# Patient Record
Sex: FEMALE | Race: BLACK OR AFRICAN AMERICAN | NOT HISPANIC OR LATINO | Employment: FULL TIME | URBAN - METROPOLITAN AREA
[De-identification: names, ages, dates, MRNs, and addresses within clinical notes are randomized per-mention and may not be internally consistent; named-entity substitution may affect disease eponyms.]

---

## 2018-02-19 ENCOUNTER — HOSPITAL ENCOUNTER (EMERGENCY)
Facility: HOSPITAL | Age: 53
Discharge: HOME | End: 2018-02-19
Attending: EMERGENCY MEDICINE | Admitting: EMERGENCY MEDICINE

## 2018-02-19 VITALS
OXYGEN SATURATION: 100 % | DIASTOLIC BLOOD PRESSURE: 77 MMHG | RESPIRATION RATE: 17 BRPM | SYSTOLIC BLOOD PRESSURE: 149 MMHG | TEMPERATURE: 97.9 F | HEIGHT: 63 IN | BODY MASS INDEX: 31.71 KG/M2 | HEART RATE: 76 BPM | WEIGHT: 179 LBS

## 2018-02-19 DIAGNOSIS — R11.2 NON-INTRACTABLE VOMITING WITH NAUSEA, UNSPECIFIED VOMITING TYPE: Primary | ICD-10-CM

## 2018-02-19 DIAGNOSIS — E87.5 HYPERKALEMIA: ICD-10-CM

## 2018-02-19 DIAGNOSIS — N39.0 ACUTE UTI: ICD-10-CM

## 2018-02-19 LAB
BACTERIA UR QL AUTO: ABNORMAL /HPF
BASOPHILS # BLD AUTO: 0.01 10*3/MM3 (ref 0–0.2)
BASOPHILS NFR BLD AUTO: 0.1 % (ref 0–1)
BILIRUB UR QL STRIP: NEGATIVE
BUN BLDA-MCNC: 19 MG/DL (ref 8–26)
BUN BLDA-MCNC: 27 MG/DL (ref 8–26)
CA-I BLDA-SCNC: 1.14 MMOL/L (ref 1.2–1.32)
CA-I BLDA-SCNC: 1.21 MMOL/L (ref 1.2–1.32)
CHLORIDE BLDA-SCNC: 103 MMOL/L (ref 98–109)
CHLORIDE BLDA-SCNC: 103 MMOL/L (ref 98–109)
CLARITY UR: CLEAR
CO2 BLDA-SCNC: 26 MMOL/L (ref 24–29)
CO2 BLDA-SCNC: 26 MMOL/L (ref 24–29)
COLOR UR: YELLOW
CREAT BLDA-MCNC: 0.8 MG/DL (ref 0.6–1.3)
CREAT BLDA-MCNC: 0.9 MG/DL (ref 0.6–1.3)
DEPRECATED RDW RBC AUTO: 45.9 FL (ref 37–54)
EOSINOPHIL # BLD AUTO: 0.07 10*3/MM3 (ref 0–0.3)
EOSINOPHIL NFR BLD AUTO: 0.9 % (ref 0–3)
ERYTHROCYTE [DISTWIDTH] IN BLOOD BY AUTOMATED COUNT: 14.7 % (ref 11.3–14.5)
FLUAV AG NPH QL: NEGATIVE
FLUBV AG NPH QL IA: NEGATIVE
GLUCOSE BLDC GLUCOMTR-MCNC: 166 MG/DL (ref 70–130)
GLUCOSE BLDC GLUCOMTR-MCNC: 244 MG/DL (ref 70–130)
GLUCOSE UR STRIP-MCNC: NEGATIVE MG/DL
HCT VFR BLD AUTO: 48.7 % (ref 34.5–44)
HCT VFR BLDA CALC: 45 % (ref 38–51)
HCT VFR BLDA CALC: 51 % (ref 38–51)
HGB BLD-MCNC: 15.9 G/DL (ref 11.5–15.5)
HGB BLDA-MCNC: 15.3 G/DL (ref 12–17)
HGB BLDA-MCNC: 17.3 G/DL (ref 12–17)
HGB UR QL STRIP.AUTO: ABNORMAL
HYALINE CASTS UR QL AUTO: ABNORMAL /LPF
IMM GRANULOCYTES # BLD: 0.03 10*3/MM3 (ref 0–0.03)
IMM GRANULOCYTES NFR BLD: 0.4 % (ref 0–0.6)
KETONES UR QL STRIP: NEGATIVE
LEUKOCYTE ESTERASE UR QL STRIP.AUTO: ABNORMAL
LYMPHOCYTES # BLD AUTO: 0.66 10*3/MM3 (ref 0.6–4.8)
LYMPHOCYTES NFR BLD AUTO: 8.1 % (ref 24–44)
MCH RBC QN AUTO: 27.9 PG (ref 27–31)
MCHC RBC AUTO-ENTMCNC: 32.6 G/DL (ref 32–36)
MCV RBC AUTO: 85.6 FL (ref 80–99)
MONOCYTES # BLD AUTO: 0.72 10*3/MM3 (ref 0–1)
MONOCYTES NFR BLD AUTO: 8.8 % (ref 0–12)
NEUTROPHILS # BLD AUTO: 6.68 10*3/MM3 (ref 1.5–8.3)
NEUTROPHILS NFR BLD AUTO: 81.7 % (ref 41–71)
NITRITE UR QL STRIP: NEGATIVE
PH UR STRIP.AUTO: 6 [PH] (ref 5–8)
PLATELET # BLD AUTO: 309 10*3/MM3 (ref 150–450)
PMV BLD AUTO: 10.4 FL (ref 6–12)
POTASSIUM BLDA-SCNC: 3.3 MMOL/L (ref 3.5–4.9)
POTASSIUM BLDA-SCNC: 5.6 MMOL/L (ref 3.5–4.9)
PROT UR QL STRIP: ABNORMAL
RBC # BLD AUTO: 5.69 10*6/MM3 (ref 3.89–5.14)
RBC # UR: ABNORMAL /HPF
REF LAB TEST METHOD: ABNORMAL
SODIUM BLDA-SCNC: 142 MMOL/L (ref 138–146)
SODIUM BLDA-SCNC: 144 MMOL/L (ref 138–146)
SP GR UR STRIP: 1.01 (ref 1–1.03)
SQUAMOUS #/AREA URNS HPF: ABNORMAL /HPF
UROBILINOGEN UR QL STRIP: ABNORMAL
WBC NRBC COR # BLD: 8.17 10*3/MM3 (ref 3.5–10.8)
WBC UR QL AUTO: ABNORMAL /HPF

## 2018-02-19 PROCEDURE — 87804 INFLUENZA ASSAY W/OPTIC: CPT | Performed by: EMERGENCY MEDICINE

## 2018-02-19 PROCEDURE — 94640 AIRWAY INHALATION TREATMENT: CPT

## 2018-02-19 PROCEDURE — 85025 COMPLETE CBC W/AUTO DIFF WBC: CPT | Performed by: EMERGENCY MEDICINE

## 2018-02-19 PROCEDURE — 25010000002 CEFTRIAXONE PER 250 MG: Performed by: EMERGENCY MEDICINE

## 2018-02-19 PROCEDURE — 80047 BASIC METABLC PNL IONIZED CA: CPT

## 2018-02-19 PROCEDURE — 81001 URINALYSIS AUTO W/SCOPE: CPT | Performed by: EMERGENCY MEDICINE

## 2018-02-19 PROCEDURE — 25010000002 ONDANSETRON PER 1 MG: Performed by: EMERGENCY MEDICINE

## 2018-02-19 PROCEDURE — 94644 CONT INHLJ TX 1ST HOUR: CPT

## 2018-02-19 PROCEDURE — 63710000001 INSULIN REGULAR HUMAN PER 5 UNITS: Performed by: EMERGENCY MEDICINE

## 2018-02-19 PROCEDURE — 25010000002 PROMETHAZINE PER 50 MG: Performed by: EMERGENCY MEDICINE

## 2018-02-19 PROCEDURE — 85014 HEMATOCRIT: CPT

## 2018-02-19 PROCEDURE — 87086 URINE CULTURE/COLONY COUNT: CPT | Performed by: EMERGENCY MEDICINE

## 2018-02-19 PROCEDURE — 93005 ELECTROCARDIOGRAM TRACING: CPT | Performed by: EMERGENCY MEDICINE

## 2018-02-19 PROCEDURE — 96365 THER/PROPH/DIAG IV INF INIT: CPT

## 2018-02-19 PROCEDURE — 96375 TX/PRO/DX INJ NEW DRUG ADDON: CPT

## 2018-02-19 PROCEDURE — 99284 EMERGENCY DEPT VISIT MOD MDM: CPT

## 2018-02-19 RX ORDER — ONDANSETRON 4 MG/1
4 TABLET, FILM COATED ORAL EVERY 6 HOURS PRN
Qty: 20 TABLET | Refills: 0 | Status: SHIPPED | OUTPATIENT
Start: 2018-02-19 | End: 2018-10-20

## 2018-02-19 RX ORDER — PROMETHAZINE HYDROCHLORIDE 25 MG/ML
12.5 INJECTION, SOLUTION INTRAMUSCULAR; INTRAVENOUS ONCE
Status: COMPLETED | OUTPATIENT
Start: 2018-02-19 | End: 2018-02-19

## 2018-02-19 RX ORDER — METOPROLOL SUCCINATE 100 MG/1
200 TABLET, EXTENDED RELEASE ORAL DAILY
COMMUNITY
End: 2018-10-20

## 2018-02-19 RX ORDER — DEXTROSE MONOHYDRATE 25 G/50ML
50 INJECTION, SOLUTION INTRAVENOUS ONCE
Status: COMPLETED | OUTPATIENT
Start: 2018-02-19 | End: 2018-02-19

## 2018-02-19 RX ORDER — ALBUTEROL SULFATE 2.5 MG/3ML
10 SOLUTION RESPIRATORY (INHALATION) ONCE
Status: COMPLETED | OUTPATIENT
Start: 2018-02-19 | End: 2018-02-19

## 2018-02-19 RX ORDER — SODIUM CHLORIDE 0.9 % (FLUSH) 0.9 %
10 SYRINGE (ML) INJECTION AS NEEDED
Status: DISCONTINUED | OUTPATIENT
Start: 2018-02-19 | End: 2018-02-19 | Stop reason: HOSPADM

## 2018-02-19 RX ORDER — NITROFURANTOIN 25; 75 MG/1; MG/1
100 CAPSULE ORAL 2 TIMES DAILY
Qty: 10 CAPSULE | Refills: 0 | Status: SHIPPED | OUTPATIENT
Start: 2018-02-19 | End: 2018-10-20

## 2018-02-19 RX ORDER — ONDANSETRON 2 MG/ML
4 INJECTION INTRAMUSCULAR; INTRAVENOUS ONCE
Status: COMPLETED | OUTPATIENT
Start: 2018-02-19 | End: 2018-02-19

## 2018-02-19 RX ORDER — CEFTRIAXONE SODIUM 1 G/50ML
1 INJECTION, SOLUTION INTRAVENOUS ONCE
Status: COMPLETED | OUTPATIENT
Start: 2018-02-19 | End: 2018-02-19

## 2018-02-19 RX ADMIN — INSULIN HUMAN 10 UNITS: 100 INJECTION, SOLUTION PARENTERAL at 06:18

## 2018-02-19 RX ADMIN — Medication 10 ML: at 05:39

## 2018-02-19 RX ADMIN — ONDANSETRON 4 MG: 2 INJECTION INTRAMUSCULAR; INTRAVENOUS at 05:43

## 2018-02-19 RX ADMIN — CEFTRIAXONE SODIUM 1 G: 1 INJECTION, SOLUTION INTRAVENOUS at 06:45

## 2018-02-19 RX ADMIN — SODIUM CHLORIDE 1000 ML: 9 INJECTION, SOLUTION INTRAVENOUS at 05:41

## 2018-02-19 RX ADMIN — DEXTROSE MONOHYDRATE 50 ML: 25 INJECTION, SOLUTION INTRAVENOUS at 06:14

## 2018-02-19 RX ADMIN — PROMETHAZINE HYDROCHLORIDE 12.5 MG: 25 INJECTION INTRAMUSCULAR; INTRAVENOUS at 05:50

## 2018-02-19 RX ADMIN — ALBUTEROL SULFATE 10 MG: 2.5 SOLUTION RESPIRATORY (INHALATION) at 06:02

## 2018-02-19 NOTE — ED PROVIDER NOTES
"Subjective   HPI Comments: 52-year-old female presents complaining of nausea and vomiting.  She states that since yesterday, she has been experiencing several episodes of nonbloody/nonbilious vomiting accompanied by crampy abdominal pain.  No fevers or diarrhea.  No recent travel.  No recent antibiotics or recent diet changes.  She states that a family member over the weekend had identical symptoms and she feels that she likely \"caught something.\"  She denies any current abdominal pain, chest pain, shortness of breath, or palpitations, but she endorses significant nausea at this time.      History provided by:  Patient   used: No        Review of Systems   Gastrointestinal: Positive for abdominal pain, nausea and vomiting.   All other systems reviewed and are negative.      No past medical history on file.    Allergies not on file    No past surgical history on file.    No family history on file.    Social History     Social History   • Marital status: N/A     Spouse name: N/A   • Number of children: N/A   • Years of education: N/A     Social History Main Topics   • Smoking status: Not on file   • Smokeless tobacco: Not on file   • Alcohol use Not on file   • Drug use: Not on file   • Sexual activity: Not on file     Other Topics Concern   • Not on file     Social History Narrative           Objective   Physical Exam   Constitutional: She is oriented to person, place, and time. She appears well-developed and well-nourished. No distress.   Well-appearing female in no acute distress   HENT:   Head: Normocephalic and atraumatic.   Mouth/Throat: Oropharynx is clear and moist.   Cardiovascular: Normal rate, regular rhythm and normal heart sounds.  Exam reveals no gallop and no friction rub.    No murmur heard.  Pulmonary/Chest: Effort normal and breath sounds normal. No respiratory distress. She has no wheezes. She has no rales.   Abdominal: Soft. Bowel sounds are normal. She exhibits no distension " "and no mass. There is no tenderness. There is no rebound and no guarding.   No focal tenderness to palpation or peritoneal signs   Genitourinary:   Genitourinary Comments: No CVA tenderness to palpation   Musculoskeletal: Normal range of motion.   Neurological: She is alert and oriented to person, place, and time.   Normal gait   Skin: Skin is warm and dry. No rash noted. She is not diaphoretic. No erythema.   Psychiatric: She has a normal mood and affect. Judgment and thought content normal.   Nursing note and vitals reviewed.      Procedures         ED Course  ED Course   Comment By Time   52-year-old female presents complaining of nausea and vomiting since yesterday.  Of note, she was around a sick family member over the weekend with identical symptoms and feels that she likely \"caught something from him.\"  On arrival to the ED, patient well-appearing with reassuring vital signs and nonsurgical abdomen.  No CVA tenderness to palpation.  Doubt cardiac etiology based on history, exam, and clinical presentation.  We will obtain labs and reassess after IV fluids and anti-emetics. Morro Boyd MD 02/19 0530   Labs remarkable for a potassium of 5.6.  Insulin, D50, and albuterol given for hyperkalemia. Morro Boyd MD 02/19 0552   EKG revealed normal sinus rhythm with heart rate of 64, no ST segments suggestive of or concerning for ischemia, and normal QRS without widening. Morro Boyd MD 02/19 0600   Of note, the patient has had a prior hysterectomy. Morro Boyd MD 02/19 0600   Flu negative. Morro Boyd MD 02/19 0623   UA suggestive of infection.  Rocephin given.  Urine culture pending. Morro Boyd MD 02/19 0630   Upon reevaluation, patient markedly improved.  Tolerating by mouth without difficulty.  Reassured and counseled regarding symptomatic treatment.  She will follow-up with her primary care physician within one week.  Prescription for Zofran when necessary.  " Agreeable with plan and given appropriate return precautions. Morro Boyd MD 02/19 0634   K downtrending. Morro Boyd MD 02/19 0642        Recent Results (from the past 24 hour(s))   CBC Auto Differential    Collection Time: 02/19/18  5:47 AM   Result Value Ref Range    WBC 8.17 3.50 - 10.80 10*3/mm3    RBC 5.69 (H) 3.89 - 5.14 10*6/mm3    Hemoglobin 15.9 (H) 11.5 - 15.5 g/dL    Hematocrit 48.7 (H) 34.5 - 44.0 %    MCV 85.6 80.0 - 99.0 fL    MCH 27.9 27.0 - 31.0 pg    MCHC 32.6 32.0 - 36.0 g/dL    RDW 14.7 (H) 11.3 - 14.5 %    RDW-SD 45.9 37.0 - 54.0 fl    MPV 10.4 6.0 - 12.0 fL    Platelets 309 150 - 450 10*3/mm3    Neutrophil % 81.7 (H) 41.0 - 71.0 %    Lymphocyte % 8.1 (L) 24.0 - 44.0 %    Monocyte % 8.8 0.0 - 12.0 %    Eosinophil % 0.9 0.0 - 3.0 %    Basophil % 0.1 0.0 - 1.0 %    Immature Grans % 0.4 0.0 - 0.6 %    Neutrophils, Absolute 6.68 1.50 - 8.30 10*3/mm3    Lymphocytes, Absolute 0.66 0.60 - 4.80 10*3/mm3    Monocytes, Absolute 0.72 0.00 - 1.00 10*3/mm3    Eosinophils, Absolute 0.07 0.00 - 0.30 10*3/mm3    Basophils, Absolute 0.01 0.00 - 0.20 10*3/mm3    Immature Grans, Absolute 0.03 0.00 - 0.03 10*3/mm3   POC CHEM 8    Collection Time: 02/19/18  5:48 AM   Result Value Ref Range    Glucose 166 (H) 70 - 130 mg/dL    BUN, Arterial 27 (H) 8 - 26 mg/dL    Creatinine 0.80 0.60 - 1.30 mg/dL    Sodium 142 138 - 146 mmol/L    Potassium 5.6 (H) 3.5 - 4.9 mmol/L    Chloride 103 98 - 109 mmol/L    Total CO2 26 24 - 29 mmol/L    Hemoglobin 17.3 (H) 12.0 - 17.0 g/dL    Hematocrit 51 38 - 51 %    Ionized Calcium 1.14 (L) 1.20 - 1.32 mmol/L   Influenza Antigen, Rapid - Swab, Nasopharynx    Collection Time: 02/19/18  5:49 AM   Result Value Ref Range    Influenza A Ag, EIA Negative Negative    Influenza B Ag, EIA Negative Negative   Urinalysis With / Culture If Indicated - Urine, Clean Catch    Collection Time: 02/19/18  6:03 AM   Result Value Ref Range    Color, UA Yellow Yellow, Straw    Appearance, UA  "Clear Clear    pH, UA 6.0 5.0 - 8.0    Specific Gravity, UA 1.010 1.005 - 1.030    Glucose, UA Negative Negative    Ketones, UA Negative Negative    Bilirubin, UA Negative Negative    Blood, UA Moderate (2+) (A) Negative    Protein, UA 30 mg/dL (1+) (A) Negative    Leuk Esterase, UA Large (3+) (A) Negative    Nitrite, UA Negative Negative    Urobilinogen, UA 1.0 E.U./dL 0.2 - 1.0 E.U./dL   Urinalysis, Microscopic Only - Urine, Clean Catch    Collection Time: 02/19/18  6:03 AM   Result Value Ref Range    RBC, UA 21-30 (A) None Seen, 0-2 /HPF    WBC, UA Too Numerous to Count (A) None Seen, 0-2 /HPF    Bacteria, UA 1+ (A) None Seen, Trace /HPF    Squamous Epithelial Cells, UA 21-30 (A) None Seen, 0-2 /HPF    Hyaline Casts, UA None Seen 0 - 6 /LPF    Methodology Manual Light Microscopy      Note: In addition to lab results from this visit, the labs listed above may include labs taken at another facility or during a different encounter within the last 24 hours. Please correlate lab times with ED admission and discharge times for further clarification of the services performed during this visit.    No orders to display     Vitals:    02/19/18 0514 02/19/18 0600 02/19/18 0602   BP: 129/74 131/72    BP Location: Left arm Right arm    Patient Position: Sitting Lying    Pulse: 76 65 58   Resp: 18  17   Temp: 97.9 °F (36.6 °C)     TempSrc: Oral     SpO2: 97% 96% 94%   Weight: 81.2 kg (179 lb)     Height: 160 cm (63\")       Medications   sodium chloride 0.9 % flush 10 mL (10 mL Intravenous Given 2/19/18 0539)   cefTRIAXone (ROCEPHIN) IVPB 1 g (not administered)   sodium chloride 0.9 % bolus 1,000 mL (1,000 mL Intravenous New Bag 2/19/18 0541)   ondansetron (ZOFRAN) injection 4 mg (4 mg Intravenous Given 2/19/18 0543)   promethazine (PHENERGAN) injection 12.5 mg (12.5 mg Intravenous Given 2/19/18 0550)   dextrose (D50W) solution 50 mL (50 mL Intravenous Given 2/19/18 8714)   insulin regular (humuLIN R,novoLIN R) injection 10 " Units (10 Units Intravenous Given 2/19/18 0618)   albuterol (PROVENTIL) nebulizer solution 0.083% 2.5 mg/3mL (10 mg Nebulization Given 2/19/18 0602)     ECG/EMG Results (last 24 hours)     Procedure Component Value Units Date/Time    ECG 12 Lead [[id number]] Collected:  02/19/18 0558     Updated:  02/19/18 0559                  [facility]    Final diagnoses:   Non-intractable vomiting with nausea, unspecified vomiting type   Hyperkalemia   Acute UTI            Morro Boyd MD  02/19/18 0643

## 2018-02-21 LAB
BACTERIA SPEC AEROBE CULT: NORMAL
BACTERIA SPEC AEROBE CULT: NORMAL

## 2018-10-20 ENCOUNTER — APPOINTMENT (OUTPATIENT)
Dept: GENERAL RADIOLOGY | Facility: HOSPITAL | Age: 53
End: 2018-10-20

## 2018-10-20 ENCOUNTER — HOSPITAL ENCOUNTER (EMERGENCY)
Facility: HOSPITAL | Age: 53
Discharge: HOME | End: 2018-10-20
Attending: EMERGENCY MEDICINE | Admitting: EMERGENCY MEDICINE

## 2018-10-20 VITALS
HEIGHT: 62 IN | OXYGEN SATURATION: 98 % | DIASTOLIC BLOOD PRESSURE: 74 MMHG | HEART RATE: 60 BPM | TEMPERATURE: 97.6 F | WEIGHT: 180 LBS | RESPIRATION RATE: 18 BRPM | BODY MASS INDEX: 33.13 KG/M2 | SYSTOLIC BLOOD PRESSURE: 125 MMHG

## 2018-10-20 DIAGNOSIS — H81.391 PERIPHERAL VERTIGO INVOLVING RIGHT EAR: Primary | ICD-10-CM

## 2018-10-20 DIAGNOSIS — I10 ELEVATED BLOOD PRESSURE READING WITH DIAGNOSIS OF HYPERTENSION: ICD-10-CM

## 2018-10-20 DIAGNOSIS — H57.11 PAIN IN RIGHT EYE: ICD-10-CM

## 2018-10-20 DIAGNOSIS — R11.0 NAUSEA: ICD-10-CM

## 2018-10-20 LAB
ALBUMIN SERPL-MCNC: 4.43 G/DL (ref 3.2–4.8)
ALBUMIN/GLOB SERPL: 1.6 G/DL (ref 1.5–2.5)
ALP SERPL-CCNC: 95 U/L (ref 25–100)
ALT SERPL W P-5'-P-CCNC: 77 U/L (ref 7–40)
ANION GAP SERPL CALCULATED.3IONS-SCNC: 8 MMOL/L (ref 3–11)
AST SERPL-CCNC: 48 U/L (ref 0–33)
BASOPHILS # BLD AUTO: 0.01 10*3/MM3 (ref 0–0.2)
BASOPHILS NFR BLD AUTO: 0.2 % (ref 0–1)
BILIRUB SERPL-MCNC: 0.7 MG/DL (ref 0.3–1.2)
BUN BLD-MCNC: 12 MG/DL (ref 9–23)
BUN/CREAT SERPL: 15.2 (ref 7–25)
CALCIUM SPEC-SCNC: 9.8 MG/DL (ref 8.7–10.4)
CHLORIDE SERPL-SCNC: 107 MMOL/L (ref 99–109)
CO2 SERPL-SCNC: 27 MMOL/L (ref 20–31)
CREAT BLD-MCNC: 0.79 MG/DL (ref 0.6–1.3)
DEPRECATED RDW RBC AUTO: 46.6 FL (ref 37–54)
EOSINOPHIL # BLD AUTO: 0.23 10*3/MM3 (ref 0–0.3)
EOSINOPHIL NFR BLD AUTO: 3.7 % (ref 0–3)
ERYTHROCYTE [DISTWIDTH] IN BLOOD BY AUTOMATED COUNT: 15.5 % (ref 11.3–14.5)
GFR SERPL CREATININE-BSD FRML MDRD: 92 ML/MIN/1.73
GLOBULIN UR ELPH-MCNC: 2.8 GM/DL
GLUCOSE BLD-MCNC: 149 MG/DL (ref 70–100)
HCT VFR BLD AUTO: 42.7 % (ref 34.5–44)
HGB BLD-MCNC: 14.1 G/DL (ref 11.5–15.5)
HOLD SPECIMEN: NORMAL
IMM GRANULOCYTES # BLD: 0.01 10*3/MM3 (ref 0–0.03)
IMM GRANULOCYTES NFR BLD: 0.2 % (ref 0–0.6)
LYMPHOCYTES # BLD AUTO: 2 10*3/MM3 (ref 0.6–4.8)
LYMPHOCYTES NFR BLD AUTO: 31.9 % (ref 24–44)
MAGNESIUM SERPL-MCNC: 2.2 MG/DL (ref 1.3–2.7)
MCH RBC QN AUTO: 27.5 PG (ref 27–31)
MCHC RBC AUTO-ENTMCNC: 33 G/DL (ref 32–36)
MCV RBC AUTO: 83.4 FL (ref 80–99)
MONOCYTES # BLD AUTO: 0.72 10*3/MM3 (ref 0–1)
MONOCYTES NFR BLD AUTO: 11.5 % (ref 0–12)
NEUTROPHILS # BLD AUTO: 3.3 10*3/MM3 (ref 1.5–8.3)
NEUTROPHILS NFR BLD AUTO: 52.7 % (ref 41–71)
NRBC BLD MANUAL-RTO: 0 /100 WBC (ref 0–0)
PLATELET # BLD AUTO: 293 10*3/MM3 (ref 150–450)
PMV BLD AUTO: 10.2 FL (ref 6–12)
POTASSIUM BLD-SCNC: 3.3 MMOL/L (ref 3.5–5.5)
PROT SERPL-MCNC: 7.2 G/DL (ref 5.7–8.2)
RBC # BLD AUTO: 5.12 10*6/MM3 (ref 3.89–5.14)
SODIUM BLD-SCNC: 142 MMOL/L (ref 132–146)
TROPONIN I SERPL-MCNC: <0.006 NG/ML
WBC NRBC COR # BLD: 6.26 10*3/MM3 (ref 3.5–10.8)
WHOLE BLOOD HOLD SPECIMEN: NORMAL
WHOLE BLOOD HOLD SPECIMEN: NORMAL

## 2018-10-20 PROCEDURE — 93005 ELECTROCARDIOGRAM TRACING: CPT | Performed by: EMERGENCY MEDICINE

## 2018-10-20 PROCEDURE — 99284 EMERGENCY DEPT VISIT MOD MDM: CPT

## 2018-10-20 PROCEDURE — 83735 ASSAY OF MAGNESIUM: CPT | Performed by: EMERGENCY MEDICINE

## 2018-10-20 PROCEDURE — 80053 COMPREHEN METABOLIC PANEL: CPT | Performed by: EMERGENCY MEDICINE

## 2018-10-20 PROCEDURE — 25010000002 ONDANSETRON PER 1 MG: Performed by: EMERGENCY MEDICINE

## 2018-10-20 PROCEDURE — 84484 ASSAY OF TROPONIN QUANT: CPT | Performed by: EMERGENCY MEDICINE

## 2018-10-20 PROCEDURE — 85025 COMPLETE CBC W/AUTO DIFF WBC: CPT | Performed by: EMERGENCY MEDICINE

## 2018-10-20 PROCEDURE — 71045 X-RAY EXAM CHEST 1 VIEW: CPT

## 2018-10-20 PROCEDURE — 96374 THER/PROPH/DIAG INJ IV PUSH: CPT

## 2018-10-20 RX ORDER — SODIUM CHLORIDE 0.9 % (FLUSH) 0.9 %
10 SYRINGE (ML) INJECTION AS NEEDED
Status: DISCONTINUED | OUTPATIENT
Start: 2018-10-20 | End: 2018-10-20 | Stop reason: HOSPADM

## 2018-10-20 RX ORDER — MECLIZINE HYDROCHLORIDE 25 MG/1
25 TABLET ORAL 3 TIMES DAILY PRN
Qty: 21 TABLET | Refills: 0 | OUTPATIENT
Start: 2018-10-20 | End: 2019-07-15

## 2018-10-20 RX ORDER — CETIRIZINE HYDROCHLORIDE, PSEUDOEPHEDRINE HYDROCHLORIDE 5; 120 MG/1; MG/1
1 TABLET, FILM COATED, EXTENDED RELEASE ORAL 2 TIMES DAILY
Status: DISCONTINUED | OUTPATIENT
Start: 2018-10-20 | End: 2018-10-20 | Stop reason: HOSPADM

## 2018-10-20 RX ORDER — FEXOFENADINE HCL 180 MG/1
180 TABLET ORAL DAILY
Qty: 30 TABLET | Refills: 0 | OUTPATIENT
Start: 2018-10-20 | End: 2019-07-15

## 2018-10-20 RX ORDER — FLUTICASONE PROPIONATE 50 MCG
2 SPRAY, SUSPENSION (ML) NASAL DAILY
Qty: 1 BOTTLE | Refills: 0 | OUTPATIENT
Start: 2018-10-20 | End: 2019-07-15

## 2018-10-20 RX ORDER — TETRACAINE HYDROCHLORIDE 5 MG/ML
2 SOLUTION OPHTHALMIC ONCE
Status: COMPLETED | OUTPATIENT
Start: 2018-10-20 | End: 2018-10-20

## 2018-10-20 RX ORDER — OXYMETAZOLINE HYDROCHLORIDE 0.05 G/100ML
1 SPRAY NASAL ONCE
Status: COMPLETED | OUTPATIENT
Start: 2018-10-20 | End: 2018-10-20

## 2018-10-20 RX ORDER — BENAZEPRIL HYDROCHLORIDE 40 MG/1
TABLET, FILM COATED ORAL DAILY
COMMUNITY

## 2018-10-20 RX ORDER — MECLIZINE HYDROCHLORIDE 25 MG/1
25 TABLET ORAL ONCE
Status: COMPLETED | OUTPATIENT
Start: 2018-10-20 | End: 2018-10-20

## 2018-10-20 RX ORDER — ONDANSETRON 2 MG/ML
4 INJECTION INTRAMUSCULAR; INTRAVENOUS ONCE
Status: COMPLETED | OUTPATIENT
Start: 2018-10-20 | End: 2018-10-20

## 2018-10-20 RX ADMIN — OXYMETAZOLINE HYDROCHLORIDE 1 SPRAY: 5 SPRAY NASAL at 07:29

## 2018-10-20 RX ADMIN — ONDANSETRON HYDROCHLORIDE 4 MG: 2 INJECTION, SOLUTION INTRAMUSCULAR; INTRAVENOUS at 07:02

## 2018-10-20 RX ADMIN — MECLIZINE 25 MG: 25 TABLET ORAL at 07:02

## 2018-10-20 RX ADMIN — CETIRIZINE HYDROCHLORIDE AND PSEUDOEPHEDRINE HYDROCHLORIDE 1 TABLET: 5; 120 TABLET, FILM COATED, EXTENDED RELEASE ORAL at 07:49

## 2018-10-20 RX ADMIN — TETRACAINE HYDROCHLORIDE 2 DROP: 5 SOLUTION OPHTHALMIC at 07:02

## 2018-10-20 NOTE — ED PROVIDER NOTES
"Subjective   The patient presents to the emergency department with the onset of dizziness and nausea when she woke this morning.  She reports she felt fine when she went to bed last night.  Patient reports the symptoms are worse with head movement as well as improved with sitting still and not moving.  Patient denies any history of similar.  No other deficits and no weakness.  She does report some ongoing right eye pain for last several weeks for which she saw her eye doctor and was referred to \"a specialist\".  She reports they didn't do any specific testing and had no diagnosis.  She reports the right eye has been intermittently painful and red.  She denies any stroke history no family history of strokes.  The patient has a history of hypertension.  She has no history of diabetes or hyperlipidemia.  No cardiac history.  No fever or chills.  No headache or neck pain.  No other acute symptoms or complaints this time.        History provided by:  Patient      Review of Systems   Constitutional: Negative.    Eyes: Positive for pain and redness.   Respiratory: Negative.    Cardiovascular: Negative.    Musculoskeletal: Negative.    Skin: Negative.    Neurological: Positive for dizziness. Negative for facial asymmetry, speech difficulty, weakness, light-headedness, numbness and headaches.   Hematological: Negative.    Psychiatric/Behavioral: Negative.    All other systems reviewed and are negative.      Past Medical History:   Diagnosis Date   • Hypertension        No Known Allergies    Past Surgical History:   Procedure Laterality Date   • BLADDER SURGERY     • HYSTERECTOMY         History reviewed. No pertinent family history.    Social History     Social History   • Marital status:      Social History Main Topics   • Smoking status: Current Every Day Smoker     Packs/day: 0.50     Types: Cigarettes   • Smokeless tobacco: Never Used   • Alcohol use Yes      Comment: occasional   • Drug use: No   • Sexual " activity: Defer     Other Topics Concern   • Not on file           Objective   Physical Exam   Constitutional: She is oriented to person, place, and time. She appears well-developed and well-nourished.   HENT:   Head: Normocephalic and atraumatic.   Mouth/Throat: Oropharynx is clear and moist.   Eyes: Pupils are equal, round, and reactive to light. Right eye exhibits no discharge, no exudate and no hordeolum. Left eye exhibits no discharge, no exudate and no hordeolum. Right conjunctiva is not injected. Right conjunctiva has no hemorrhage. Left conjunctiva is not injected. Left conjunctiva has no hemorrhage. Right eye exhibits nystagmus.   [facility] Ton[facility] used at bedside shows an intraocular pressures of 13 on the right and 11 on the left.    Neck: Normal range of motion. Neck supple. Normal carotid pulses present. Carotid bruit is not present.   Cardiovascular: Normal rate, regular rhythm, normal heart sounds and intact distal pulses.    Pulmonary/Chest: Effort normal and breath sounds normal.   Abdominal: Soft. Bowel sounds are normal.   Musculoskeletal: Normal range of motion.   Neurological: She is alert and oriented to person, place, and time.   HINTS exam inconclusive.  No neurologic deficit.  NIH stroke scale 0.   Skin: Skin is warm and dry.   Psychiatric: She has a normal mood and affect. Her behavior is normal.   Nursing note and vitals reviewed.      Procedures           ED Course  ED Course as of Oct 20 1517   Sat Oct 20, 2018   0648 HINTS exam inconclusive as the patient is only minimally symptomatic at this time, though she did report symptoms worsened with the head movement.   [RS]   0718 Patient rechecked with [facility] IOP check 13OD/11OS.   [RS]   0804 The patient reports near complete resolution of her symptoms.  He reports only minimal symptoms when she gets up and moves.  Patient otherwise stable.  We'll discharge her home to follow-up with her doctor for ongoing care.  She is to follow-up  with her eye specialist as scheduled.  She is to return the ER for any concerns.  She voices understanding and is happy with this plan.  [RS]      ED Course User Index  [RS] Lucius Talley MD                  MDM  Number of Diagnoses or Management Options  Elevated blood pressure reading with diagnosis of hypertension:   Nausea:   Pain in right eye:   Peripheral vertigo involving right ear:   Diagnosis management comments: Recent Results (from the past 24 hour(s))  -Comprehensive Metabolic Panel  Collection Time: 10/20/18  6:55 AM       Result                      Value             Ref Range           Glucose                     149 (H)           70 - 100 mg/*       BUN                         12                9 - 23 mg/dL        Creatinine                  0.79              0.60 - 1.30 *       Sodium                      142               132 - 146 mm*       Potassium                   3.3 (L)           3.5 - 5.5 mm*       Chloride                    107               99 - 109 mmo*       CO2                         27.0              20.0 - 31.0 *       Calcium                     9.8               8.7 - 10.4 m*       Total Protein               7.2               5.7 - 8.2 g/*       Albumin                     4.43              3.20 - 4.80 *       ALT (SGPT)                  77 (H)            7 - 40 U/L          AST (SGOT)                  48 (H)            0 - 33 U/L          Alkaline Phosphatase        95                25 - 100 U/L        Total Bilirubin             0.7               0.3 - 1.2 mg*       eGFR   Amer          92                >60 mL/min/1*       Globulin                    2.8               gm/dL               A/G Ratio                   1.6               1.5 - 2.5 g/*       BUN/Creatinine Ratio        15.2              7.0 - 25.0          Anion Gap                   8.0               3.0 - 11.0 m*  -Magnesium  Collection Time: 10/20/18  6:55 AM       Result                       Value             Ref Range           Magnesium                   2.2               1.3 - 2.7 mg*  -Troponin  Collection Time: 10/20/18  6:55 AM       Result                      Value             Ref Range           Troponin I                  <0.006            <=0.039 ng/mL  -Light Blue Top  Collection Time: 10/20/18  6:55 AM       Result                      Value             Ref Range           Extra Tube                                                    hold for add-on  -Green Top (Gel)  Collection Time: 10/20/18  6:55 AM       Result                      Value             Ref Range           Extra Tube                                                    Hold for add-ons.  -Lavender Top  Collection Time: 10/20/18  6:55 AM       Result                      Value             Ref Range           Extra Tube                                                    hold for add-on  -CBC Auto Differential  Collection Time: 10/20/18  6:55 AM       Result                      Value             Ref Range           WBC                         6.26              3.50 - 10.80*       RBC                         5.12              3.89 - 5.14 *       Hemoglobin                  14.1              11.5 - 15.5 *       Hematocrit                  42.7              34.5 - 44.0 %       MCV                         83.4              80.0 - 99.0 *       MCH                         27.5              27.0 - 31.0 *       MCHC                        33.0              32.0 - 36.0 *       RDW                         15.5 (H)          11.3 - 14.5 %       RDW-SD                      46.6              37.0 - 54.0 *       MPV                         10.2              6.0 - 12.0 fL       Platelets                   293               150 - 450 10*       Neutrophil %                52.7              41.0 - 71.0 %       Lymphocyte %                31.9              24.0 - 44.0 %       Monocyte %                  11.5              0.0 - 12.0 %         Eosinophil %                3.7 (H)           0.0 - 3.0 %         Basophil %                  0.2               0.0 - 1.0 %         Immature Grans %            0.2               0.0 - 0.6 %         Neutrophils, Absolute       3.30              1.50 - 8.30 *       Lymphocytes, Absolute       2.00              0.60 - 4.80 *       Monocytes, Absolute         0.72              0.00 - 1.00 *       Eosinophils, Absolute       0.23              0.00 - 0.30 *       Basophils, Absolute         0.01              0.00 - 0.20 *       Immature Grans, Absolu*     0.01              0.00 - 0.03 *       nRBC                        0.0               0.0 - 0.0 /1*  Note: In addition to lab results from this visit, the labs listed above may include labs taken at another facility or during a different encounter within the last 24 hours. Please correlate lab times with ED admission and discharge times for further clarification of the services performed during this visit.    XR Chest 1 View   Final Result    No acute cardiopulmonary abnormality.         DICTATED:   10/20/2018    EDITED/ls :   10/20/2018          This report was finalized on 10/20/2018 1:47 PM by Jose De Souza.          -----------------------------------------------------            10/20/18  10/20/18  10/20/18  10/20/18               0700      0730      0800      0829     -----------------------------------------------------   BP:       126/71              125/74               Pulse:                59                  60       Resp:                 20                  18       Temp:                                              Temrc:                                           SpO2:    (!) 89%     95%       98%                 Weight:                                            Height:                                           -----------------------------------------------------  Medications  tetracaine  (ALTACAINE) 0.5 % ophthalmic solution 2 drop (2 drops Both Eyes Given 10/20/18 0702)  meclizine (ANTIVERT) tablet 25 mg (25 mg Oral Given 10/20/18 0702)  ondansetron (ZOFRAN) injection 4 mg (4 mg Intravenous Given 10/20/18 0702)  oxymetazoline (AFRIN) nasal spray 1 spray (1 spray Nasal Given 10/20/18 0729)  ECG/EMG Results (last 24 hours)     ** No results found for the last 24 hours. **           Amount and/or Complexity of Data Reviewed  Clinical lab tests: reviewed          Final diagnoses:   Peripheral vertigo involving right ear   Nausea   Pain in right eye   Elevated blood pressure reading with diagnosis of hypertension            Gee Mendoza  10/20/18 8226       Lucius Talley MD  10/20/18

## 2019-04-05 ENCOUNTER — HOSPITAL ENCOUNTER (EMERGENCY)
Facility: HOSPITAL | Age: 54
Discharge: HOME | End: 2019-04-05
Attending: EMERGENCY MEDICINE | Admitting: NURSE PRACTITIONER

## 2019-04-05 VITALS
DIASTOLIC BLOOD PRESSURE: 56 MMHG | HEART RATE: 56 BPM | SYSTOLIC BLOOD PRESSURE: 118 MMHG | HEIGHT: 62 IN | WEIGHT: 180 LBS | OXYGEN SATURATION: 92 % | TEMPERATURE: 97.8 F | RESPIRATION RATE: 18 BRPM | BODY MASS INDEX: 33.13 KG/M2

## 2019-04-05 DIAGNOSIS — R11.2 NON-INTRACTABLE VOMITING WITH NAUSEA, UNSPECIFIED VOMITING TYPE: Primary | ICD-10-CM

## 2019-04-05 DIAGNOSIS — R74.8 ELEVATED LIVER ENZYMES: ICD-10-CM

## 2019-04-05 LAB
ALBUMIN SERPL-MCNC: 4.82 G/DL (ref 3.2–4.8)
ALBUMIN/GLOB SERPL: 1.6 G/DL (ref 1.5–2.5)
ALP SERPL-CCNC: 112 U/L (ref 25–100)
ALT SERPL W P-5'-P-CCNC: 60 U/L (ref 7–40)
ANION GAP SERPL CALCULATED.3IONS-SCNC: 11 MMOL/L (ref 3–11)
AST SERPL-CCNC: 38 U/L (ref 0–33)
BACTERIA UR QL AUTO: ABNORMAL /HPF
BASOPHILS # BLD AUTO: 0.02 10*3/MM3 (ref 0–0.2)
BASOPHILS NFR BLD AUTO: 0.3 % (ref 0–1)
BILIRUB SERPL-MCNC: 0.8 MG/DL (ref 0.3–1.2)
BILIRUB UR QL STRIP: NEGATIVE
BUN BLD-MCNC: 12 MG/DL (ref 9–23)
BUN/CREAT SERPL: 14.3 (ref 7–25)
CALCIUM SPEC-SCNC: 10.6 MG/DL (ref 8.7–10.4)
CHLORIDE SERPL-SCNC: 102 MMOL/L (ref 99–109)
CLARITY UR: CLEAR
CO2 SERPL-SCNC: 28 MMOL/L (ref 20–31)
COLOR UR: YELLOW
CREAT BLD-MCNC: 0.84 MG/DL (ref 0.6–1.3)
DEPRECATED RDW RBC AUTO: 44.8 FL (ref 37–54)
EOSINOPHIL # BLD AUTO: 0.07 10*3/MM3 (ref 0–0.3)
EOSINOPHIL NFR BLD AUTO: 0.9 % (ref 0–3)
ERYTHROCYTE [DISTWIDTH] IN BLOOD BY AUTOMATED COUNT: 14.8 % (ref 11.3–14.5)
GFR SERPL CREATININE-BSD FRML MDRD: 86 ML/MIN/1.73
GLOBULIN UR ELPH-MCNC: 3 GM/DL
GLUCOSE BLD-MCNC: 125 MG/DL (ref 70–100)
GLUCOSE UR STRIP-MCNC: NEGATIVE MG/DL
HCT VFR BLD AUTO: 43.1 % (ref 34.5–44)
HGB BLD-MCNC: 14.7 G/DL (ref 11.5–15.5)
HGB UR QL STRIP.AUTO: ABNORMAL
HOLD SPECIMEN: NORMAL
HOLD SPECIMEN: NORMAL
HYALINE CASTS UR QL AUTO: ABNORMAL /LPF
IMM GRANULOCYTES # BLD AUTO: 0.01 10*3/MM3 (ref 0–0.05)
IMM GRANULOCYTES NFR BLD AUTO: 0.1 % (ref 0–0.6)
KETONES UR QL STRIP: NEGATIVE
LEUKOCYTE ESTERASE UR QL STRIP.AUTO: ABNORMAL
LIPASE SERPL-CCNC: 42 U/L (ref 6–51)
LYMPHOCYTES # BLD AUTO: 1.43 10*3/MM3 (ref 0.6–4.8)
LYMPHOCYTES NFR BLD AUTO: 18.9 % (ref 24–44)
MCH RBC QN AUTO: 28.1 PG (ref 27–31)
MCHC RBC AUTO-ENTMCNC: 34.1 G/DL (ref 32–36)
MCV RBC AUTO: 82.4 FL (ref 80–99)
MONOCYTES # BLD AUTO: 0.46 10*3/MM3 (ref 0–1)
MONOCYTES NFR BLD AUTO: 6.1 % (ref 0–12)
NEUTROPHILS # BLD AUTO: 5.59 10*3/MM3 (ref 1.5–8.3)
NEUTROPHILS NFR BLD AUTO: 73.7 % (ref 41–71)
NITRITE UR QL STRIP: NEGATIVE
PH UR STRIP.AUTO: >=9 [PH] (ref 5–8)
PLATELET # BLD AUTO: 307 10*3/MM3 (ref 150–450)
PMV BLD AUTO: 10.2 FL (ref 6–12)
POTASSIUM BLD-SCNC: 3.8 MMOL/L (ref 3.5–5.5)
PROT SERPL-MCNC: 7.8 G/DL (ref 5.7–8.2)
PROT UR QL STRIP: NEGATIVE
RBC # BLD AUTO: 5.23 10*6/MM3 (ref 3.89–5.14)
RBC # UR: ABNORMAL /HPF
REF LAB TEST METHOD: ABNORMAL
SODIUM BLD-SCNC: 141 MMOL/L (ref 132–146)
SP GR UR STRIP: 1.02 (ref 1–1.03)
SQUAMOUS #/AREA URNS HPF: ABNORMAL /HPF
UROBILINOGEN UR QL STRIP: ABNORMAL
WBC NRBC COR # BLD: 7.58 10*3/MM3 (ref 3.5–10.8)
WBC UR QL AUTO: ABNORMAL /HPF
WHOLE BLOOD HOLD SPECIMEN: NORMAL
WHOLE BLOOD HOLD SPECIMEN: NORMAL

## 2019-04-05 PROCEDURE — 99283 EMERGENCY DEPT VISIT LOW MDM: CPT

## 2019-04-05 PROCEDURE — 96374 THER/PROPH/DIAG INJ IV PUSH: CPT

## 2019-04-05 PROCEDURE — 80053 COMPREHEN METABOLIC PANEL: CPT

## 2019-04-05 PROCEDURE — 81001 URINALYSIS AUTO W/SCOPE: CPT | Performed by: EMERGENCY MEDICINE

## 2019-04-05 PROCEDURE — 85025 COMPLETE CBC W/AUTO DIFF WBC: CPT

## 2019-04-05 PROCEDURE — 25010000002 ONDANSETRON PER 1 MG: Performed by: NURSE PRACTITIONER

## 2019-04-05 PROCEDURE — 83690 ASSAY OF LIPASE: CPT

## 2019-04-05 RX ORDER — SODIUM CHLORIDE 0.9 % (FLUSH) 0.9 %
10 SYRINGE (ML) INJECTION AS NEEDED
Status: DISCONTINUED | OUTPATIENT
Start: 2019-04-05 | End: 2019-04-06 | Stop reason: HOSPADM

## 2019-04-05 RX ORDER — BUPROPION HYDROCHLORIDE 150 MG/1
150 TABLET, EXTENDED RELEASE ORAL 2 TIMES DAILY
COMMUNITY
End: 2019-07-15

## 2019-04-05 RX ORDER — ONDANSETRON 4 MG/1
4 TABLET, ORALLY DISINTEGRATING ORAL 4 TIMES DAILY PRN
Qty: 16 TABLET | Refills: 0 | OUTPATIENT
Start: 2019-04-05 | End: 2019-07-15

## 2019-04-05 RX ORDER — ONDANSETRON 2 MG/ML
4 INJECTION INTRAMUSCULAR; INTRAVENOUS ONCE
Status: COMPLETED | OUTPATIENT
Start: 2019-04-05 | End: 2019-04-05

## 2019-04-05 RX ADMIN — SODIUM CHLORIDE 1000 ML: 9 INJECTION, SOLUTION INTRAVENOUS at 21:17

## 2019-04-05 RX ADMIN — ONDANSETRON 4 MG: 2 INJECTION INTRAMUSCULAR; INTRAVENOUS at 21:29

## 2019-04-06 NOTE — ED PROVIDER NOTES
Subjective   Rosalia Langston is a 53 y.o. female who presents to the emergency department with complaints of vomiting that started at 1300 today. The patient states that she has nausea, diaphoresis, and diarrhea. She reports that she is currently prescribed Wellbutrin XL and that she was told to increase her dose to 2 pills a day today. She had two and started vomiting after the first one. She has a PMHx of hypertension. She denies any abdominal pain, chest pain, SOA, sick contacts, increase in frequency or urgency of urination, and any other acute symptoms at this time.         History provided by:  Patient  Vomiting   The primary symptoms include nausea, vomiting and diarrhea. Primary symptoms do not include abdominal pain. The illness began today. The onset was sudden. The problem has not changed since onset.      Review of Systems   Constitutional: Positive for diaphoresis.   Respiratory: Negative for shortness of breath.    Cardiovascular: Negative for chest pain.   Gastrointestinal: Positive for diarrhea, nausea and vomiting. Negative for abdominal pain.   Genitourinary: Negative for frequency and urgency.   All other systems reviewed and are negative.      Past Medical History:   Diagnosis Date   • Hypertension        No Known Allergies    Past Surgical History:   Procedure Laterality Date   • BLADDER SURGERY     • HYSTERECTOMY         History reviewed. No pertinent family history.    Social History     Socioeconomic History   • Marital status:      Spouse name: Not on file   • Number of children: Not on file   • Years of education: Not on file   • Highest education level: Not on file   Tobacco Use   • Smoking status: Current Every Day Smoker     Packs/day: 0.50     Types: Cigarettes   • Smokeless tobacco: Never Used   Substance and Sexual Activity   • Alcohol use: Yes     Comment: occasional   • Drug use: No   • Sexual activity: Defer         Objective   Physical Exam   Constitutional: She is oriented  to person, place, and time. She appears well-developed and well-nourished. She is cooperative.  Non-toxic appearance.   Pt is lying in bed, no acute distress.    HENT:   Head: Normocephalic and atraumatic.   Nose: Nose normal.   Mouth/Throat: Oropharynx is clear and moist.   Eyes: Conjunctivae, EOM and lids are normal. Pupils are equal, round, and reactive to light.   Neck: Trachea normal, normal range of motion and full passive range of motion without pain.   Cardiovascular: Regular rhythm, normal heart sounds, intact distal pulses and normal pulses.   Pulmonary/Chest: Effort normal and breath sounds normal. No respiratory distress. She has no decreased breath sounds. She has no wheezes. She has no rhonchi. She has no rales.   Abdominal: Soft. Normal appearance and bowel sounds are normal. She exhibits no distension. There is no tenderness.   Musculoskeletal: Normal range of motion.   Neurological: She is alert and oriented to person, place, and time. She has normal strength. No cranial nerve deficit.   Skin: Skin is warm, dry and intact. No rash noted.   Psychiatric: She has a normal mood and affect. Her speech is normal and behavior is normal.   Nursing note and vitals reviewed.      Procedures         ED Course  ED Course as of Apr 08 1247   Fri Apr 05, 2019   2215 Pt is feeling better at this time. Pt denies any pain, and reports that the nausea is better.  I discussed with the patient that one of the side effects of wellbutrin is vomiting.  Pt took the 2 doses of med less than 5 hrs apart.  Pt will be dc home with zofran. Pt to discuss ER visit with PCP.  Pt agrees and verb understanding.   [KG]      ED Course User Index  [KG] Cherelle Frost C, APRN      No results found for this or any previous visit (from the past 24 hour(s)).  Note: In addition to lab results from this visit, the labs listed above may include labs taken at another facility or during a different encounter within the last 24 hours. Please  "correlate lab times with ED admission and discharge times for further clarification of the services performed during this visit.    No orders to display     Vitals:    04/05/19 1903 04/05/19 2142   BP: 118/56    Patient Position: Sitting    Pulse: 71 56   Resp: 18    Temp: 97.8 °F (36.6 °C)    TempSrc: Oral    SpO2: 100% 92%   Weight: 81.6 kg (180 lb)    Height: 157.5 cm (62\")      Medications   sodium chloride 0.9 % bolus 1,000 mL (0 mL Intravenous Stopped 4/5/19 2231)   ondansetron (ZOFRAN) injection 4 mg (4 mg Intravenous Given 4/5/19 2129)     ECG/EMG Results (last 24 hours)     ** No results found for the last 24 hours. **        No orders to display                         MDM    Final diagnoses:   Non-intractable vomiting with nausea, unspecified vomiting type   Elevated liver enzymes       Documentation assistance provided by rebecca Farmer.  Information recorded by the scribe was done at my direction and has been verified and validated by me.     Ivanna Farmer  04/05/19 2118       Cherelle Frost APRN  04/08/19 1247    "

## 2019-07-15 ENCOUNTER — HOSPITAL ENCOUNTER (EMERGENCY)
Facility: HOSPITAL | Age: 54
Discharge: HOME | End: 2019-07-15
Attending: EMERGENCY MEDICINE | Admitting: EMERGENCY MEDICINE

## 2019-07-15 ENCOUNTER — APPOINTMENT (OUTPATIENT)
Dept: CARDIOLOGY | Facility: HOSPITAL | Age: 54
End: 2019-07-15

## 2019-07-15 VITALS
OXYGEN SATURATION: 96 % | HEIGHT: 62 IN | SYSTOLIC BLOOD PRESSURE: 117 MMHG | DIASTOLIC BLOOD PRESSURE: 74 MMHG | RESPIRATION RATE: 16 BRPM | WEIGHT: 179.9 LBS | BODY MASS INDEX: 33.1 KG/M2 | HEART RATE: 84 BPM | TEMPERATURE: 98.5 F

## 2019-07-15 DIAGNOSIS — M79.661 RIGHT CALF PAIN: ICD-10-CM

## 2019-07-15 DIAGNOSIS — N89.8 VAGINAL DISCHARGE, BLOODY: Primary | ICD-10-CM

## 2019-07-15 LAB
ANION GAP SERPL CALCULATED.3IONS-SCNC: 14 MMOL/L (ref 5–15)
BACTERIA UR QL AUTO: ABNORMAL /HPF
BASOPHILS # BLD AUTO: 0.03 10*3/MM3 (ref 0–0.2)
BASOPHILS NFR BLD AUTO: 0.4 % (ref 0–1.5)
BH CV LOWER VASCULAR LEFT COMMON FEMORAL AUGMENT: NORMAL
BH CV LOWER VASCULAR LEFT COMMON FEMORAL COMPRESS: NORMAL
BH CV LOWER VASCULAR LEFT COMMON FEMORAL PHASIC: NORMAL
BH CV LOWER VASCULAR LEFT COMMON FEMORAL SPONT: NORMAL
BH CV LOWER VASCULAR RIGHT COMMON FEMORAL AUGMENT: NORMAL
BH CV LOWER VASCULAR RIGHT COMMON FEMORAL COMPRESS: NORMAL
BH CV LOWER VASCULAR RIGHT COMMON FEMORAL PHASIC: NORMAL
BH CV LOWER VASCULAR RIGHT COMMON FEMORAL SPONT: NORMAL
BH CV LOWER VASCULAR RIGHT DISTAL FEMORAL AUGMENT: NORMAL
BH CV LOWER VASCULAR RIGHT DISTAL FEMORAL COMPRESS: NORMAL
BH CV LOWER VASCULAR RIGHT DISTAL FEMORAL PHASIC: NORMAL
BH CV LOWER VASCULAR RIGHT DISTAL FEMORAL SPONT: NORMAL
BH CV LOWER VASCULAR RIGHT GASTRONEMIUS COMPRESS: NORMAL
BH CV LOWER VASCULAR RIGHT GREATER SAPH AK COMPRESS: NORMAL
BH CV LOWER VASCULAR RIGHT GREATER SAPH BK COMPRESS: NORMAL
BH CV LOWER VASCULAR RIGHT LESSER SAPH COMPRESS: NORMAL
BH CV LOWER VASCULAR RIGHT MID FEMORAL AUGMENT: NORMAL
BH CV LOWER VASCULAR RIGHT MID FEMORAL COMPRESS: NORMAL
BH CV LOWER VASCULAR RIGHT MID FEMORAL PHASIC: NORMAL
BH CV LOWER VASCULAR RIGHT MID FEMORAL SPONT: NORMAL
BH CV LOWER VASCULAR RIGHT PERONEAL COMPRESS: NORMAL
BH CV LOWER VASCULAR RIGHT POPLITEAL AUGMENT: NORMAL
BH CV LOWER VASCULAR RIGHT POPLITEAL COMPRESS: NORMAL
BH CV LOWER VASCULAR RIGHT POPLITEAL PHASIC: NORMAL
BH CV LOWER VASCULAR RIGHT POPLITEAL SPONT: NORMAL
BH CV LOWER VASCULAR RIGHT POSTERIOR TIBIAL COMPRESS: NORMAL
BH CV LOWER VASCULAR RIGHT PROFUNDA FEMORAL COMPRESS: NORMAL
BH CV LOWER VASCULAR RIGHT PROFUNDA FEMORAL PHASIC: NORMAL
BH CV LOWER VASCULAR RIGHT PROFUNDA FEMORAL SPONT: NORMAL
BH CV LOWER VASCULAR RIGHT PROXIMAL FEMORAL AUGMENT: NORMAL
BH CV LOWER VASCULAR RIGHT PROXIMAL FEMORAL COMPRESS: NORMAL
BH CV LOWER VASCULAR RIGHT PROXIMAL FEMORAL PHASIC: NORMAL
BH CV LOWER VASCULAR RIGHT PROXIMAL FEMORAL SPONT: NORMAL
BH CV LOWER VASCULAR RIGHT SAPHENOFEMORAL JUNCTION COMPRESS: NORMAL
BH CV LOWER VASCULAR RIGHT SAPHENOFEMORAL JUNCTION PHASIC: NORMAL
BH CV LOWER VASCULAR RIGHT SAPHENOFEMORAL JUNCTION SPONT: NORMAL
BILIRUB UR QL STRIP: NEGATIVE
BILIRUB UR QL STRIP: NEGATIVE
BUN BLD-MCNC: 17 MG/DL (ref 6–20)
BUN/CREAT SERPL: 19.8 (ref 7–25)
CALCIUM SPEC-SCNC: 9.7 MG/DL (ref 8.6–10.5)
CHLORIDE SERPL-SCNC: 101 MMOL/L (ref 98–107)
CLARITY UR: CLEAR
CLARITY UR: CLEAR
CLUE CELLS SPEC QL WET PREP: ABNORMAL
CO2 SERPL-SCNC: 26 MMOL/L (ref 22–29)
COLOR UR: YELLOW
COLOR UR: YELLOW
CREAT BLD-MCNC: 0.86 MG/DL (ref 0.57–1)
DEPRECATED RDW RBC AUTO: 46.4 FL (ref 37–54)
EOSINOPHIL # BLD AUTO: 0.23 10*3/MM3 (ref 0–0.4)
EOSINOPHIL NFR BLD AUTO: 2.9 % (ref 0.3–6.2)
ERYTHROCYTE [DISTWIDTH] IN BLOOD BY AUTOMATED COUNT: 15.1 % (ref 12.3–15.4)
GFR SERPL CREATININE-BSD FRML MDRD: 83 ML/MIN/1.73
GLUCOSE BLD-MCNC: 138 MG/DL (ref 65–99)
GLUCOSE UR STRIP-MCNC: NEGATIVE MG/DL
GLUCOSE UR STRIP-MCNC: NEGATIVE MG/DL
HCT VFR BLD AUTO: 41.8 % (ref 34–46.6)
HGB BLD-MCNC: 13.2 G/DL (ref 12–15.9)
HGB UR QL STRIP.AUTO: ABNORMAL
HGB UR QL STRIP.AUTO: NEGATIVE
HOLD SPECIMEN: NORMAL
HYALINE CASTS UR QL AUTO: ABNORMAL /LPF
HYDATID CYST SPEC WET PREP: ABNORMAL
IMM GRANULOCYTES # BLD AUTO: 0.03 10*3/MM3 (ref 0–0.05)
IMM GRANULOCYTES NFR BLD AUTO: 0.4 % (ref 0–0.5)
KETONES UR QL STRIP: NEGATIVE
KETONES UR QL STRIP: NEGATIVE
KOH PREP NAIL: NORMAL
LEUKOCYTE ESTERASE UR QL STRIP.AUTO: ABNORMAL
LEUKOCYTE ESTERASE UR QL STRIP.AUTO: NEGATIVE
LYMPHOCYTES # BLD AUTO: 2.08 10*3/MM3 (ref 0.7–3.1)
LYMPHOCYTES NFR BLD AUTO: 26.5 % (ref 19.6–45.3)
MCH RBC QN AUTO: 26.7 PG (ref 26.6–33)
MCHC RBC AUTO-ENTMCNC: 31.6 G/DL (ref 31.5–35.7)
MCV RBC AUTO: 84.4 FL (ref 79–97)
MONOCYTES # BLD AUTO: 0.83 10*3/MM3 (ref 0.1–0.9)
MONOCYTES NFR BLD AUTO: 10.6 % (ref 5–12)
NEUTROPHILS # BLD AUTO: 4.64 10*3/MM3 (ref 1.7–7)
NEUTROPHILS NFR BLD AUTO: 59.2 % (ref 42.7–76)
NITRITE UR QL STRIP: NEGATIVE
NITRITE UR QL STRIP: NEGATIVE
NRBC BLD AUTO-RTO: 0 /100 WBC (ref 0–0.2)
PH UR STRIP.AUTO: 6 [PH] (ref 5–8)
PH UR STRIP.AUTO: <=5 [PH] (ref 5–8)
PLATELET # BLD AUTO: 292 10*3/MM3 (ref 140–450)
PMV BLD AUTO: 10.6 FL (ref 6–12)
POTASSIUM BLD-SCNC: 3.4 MMOL/L (ref 3.5–5.2)
PROT UR QL STRIP: NEGATIVE
PROT UR QL STRIP: NEGATIVE
RBC # BLD AUTO: 4.95 10*6/MM3 (ref 3.77–5.28)
RBC # UR: ABNORMAL /HPF
REF LAB TEST METHOD: ABNORMAL
SODIUM BLD-SCNC: 141 MMOL/L (ref 136–145)
SP GR UR STRIP: 1.01 (ref 1–1.03)
SP GR UR STRIP: 1.02 (ref 1–1.03)
SQUAMOUS #/AREA URNS HPF: ABNORMAL /HPF
T VAGINALIS SPEC QL WET PREP: ABNORMAL
UROBILINOGEN UR QL STRIP: ABNORMAL
UROBILINOGEN UR QL STRIP: NORMAL
WBC NRBC COR # BLD: 7.84 10*3/MM3 (ref 3.4–10.8)
WBC SPEC QL WET PREP: ABNORMAL
WBC UR QL AUTO: ABNORMAL /HPF
WHOLE BLOOD HOLD SPECIMEN: NORMAL
WHOLE BLOOD HOLD SPECIMEN: NORMAL
YEAST GENITAL QL WET PREP: ABNORMAL

## 2019-07-15 PROCEDURE — P9612 CATHETERIZE FOR URINE SPEC: HCPCS

## 2019-07-15 PROCEDURE — 93971 EXTREMITY STUDY: CPT | Performed by: INTERNAL MEDICINE

## 2019-07-15 PROCEDURE — 87591 N.GONORRHOEAE DNA AMP PROB: CPT | Performed by: EMERGENCY MEDICINE

## 2019-07-15 PROCEDURE — 85025 COMPLETE CBC W/AUTO DIFF WBC: CPT | Performed by: EMERGENCY MEDICINE

## 2019-07-15 PROCEDURE — 93971 EXTREMITY STUDY: CPT

## 2019-07-15 PROCEDURE — 87210 SMEAR WET MOUNT SALINE/INK: CPT | Performed by: EMERGENCY MEDICINE

## 2019-07-15 PROCEDURE — 99284 EMERGENCY DEPT VISIT MOD MDM: CPT

## 2019-07-15 PROCEDURE — 81003 URINALYSIS AUTO W/O SCOPE: CPT | Performed by: EMERGENCY MEDICINE

## 2019-07-15 PROCEDURE — 87220 TISSUE EXAM FOR FUNGI: CPT | Performed by: EMERGENCY MEDICINE

## 2019-07-15 PROCEDURE — 80048 BASIC METABOLIC PNL TOTAL CA: CPT | Performed by: EMERGENCY MEDICINE

## 2019-07-15 PROCEDURE — 81001 URINALYSIS AUTO W/SCOPE: CPT | Performed by: EMERGENCY MEDICINE

## 2019-07-15 PROCEDURE — 87491 CHLMYD TRACH DNA AMP PROBE: CPT | Performed by: EMERGENCY MEDICINE

## 2019-07-15 RX ORDER — MELOXICAM 15 MG/1
15 TABLET ORAL DAILY
Qty: 7 TABLET | Refills: 0 | Status: SHIPPED | OUTPATIENT
Start: 2019-07-15 | End: 2020-06-10

## 2019-07-15 RX ORDER — CHLORTHALIDONE 25 MG/1
25 TABLET ORAL DAILY
COMMUNITY
End: 2020-06-11 | Stop reason: HOSPADM

## 2019-07-15 RX ORDER — ACETAMINOPHEN 500 MG
1000 TABLET ORAL ONCE
Status: COMPLETED | OUTPATIENT
Start: 2019-07-15 | End: 2019-07-15

## 2019-07-15 RX ORDER — SODIUM CHLORIDE 0.9 % (FLUSH) 0.9 %
10 SYRINGE (ML) INJECTION AS NEEDED
Status: DISCONTINUED | OUTPATIENT
Start: 2019-07-15 | End: 2019-07-15 | Stop reason: HOSPADM

## 2019-07-15 RX ORDER — PAROXETINE HYDROCHLORIDE 20 MG/1
20 TABLET, FILM COATED ORAL EVERY MORNING
COMMUNITY

## 2019-07-15 RX ORDER — NAPROXEN 250 MG/1
500 TABLET ORAL ONCE
Status: COMPLETED | OUTPATIENT
Start: 2019-07-15 | End: 2019-07-15

## 2019-07-15 RX ADMIN — ACETAMINOPHEN 1000 MG: 500 TABLET, FILM COATED ORAL at 06:25

## 2019-07-15 RX ADMIN — NAPROXEN 500 MG: 250 TABLET ORAL at 06:25

## 2019-07-17 LAB
C TRACH RRNA SPEC DONR QL NAA+PROBE: NEGATIVE
N GONORRHOEA DNA SPEC QL NAA+PROBE: NEGATIVE

## 2019-07-27 PROBLEM — Z01.419 WELL WOMAN EXAM: Status: ACTIVE | Noted: 2019-07-27

## 2019-07-29 ENCOUNTER — OFFICE VISIT (OUTPATIENT)
Dept: OBSTETRICS AND GYNECOLOGY | Facility: CLINIC | Age: 54
End: 2019-07-29

## 2019-07-29 VITALS
WEIGHT: 190 LBS | SYSTOLIC BLOOD PRESSURE: 118 MMHG | RESPIRATION RATE: 14 BRPM | BODY MASS INDEX: 34.74 KG/M2 | DIASTOLIC BLOOD PRESSURE: 76 MMHG

## 2019-07-29 DIAGNOSIS — N95.2 ATROPHIC VAGINITIS: Primary | ICD-10-CM

## 2019-07-29 PROBLEM — F17.200 SMOKER: Status: ACTIVE | Noted: 2019-07-29

## 2019-07-29 PROCEDURE — 99203 OFFICE O/P NEW LOW 30 MIN: CPT | Performed by: OBSTETRICS & GYNECOLOGY

## 2019-07-29 RX ORDER — ESTRADIOL 0.1 MG/G
CREAM VAGINAL
Qty: 1 EACH | Refills: 12 | Status: SHIPPED | OUTPATIENT
Start: 2019-07-29 | End: 2020-06-10

## 2019-07-29 NOTE — PROGRESS NOTES
Subjective   Chief Complaint   Patient presents with   • \[facility]\"" Care     Rosalia Langston is a 54 y.o. year old .  No LMP recorded (lmp unknown). Patient has had a hysterectomy.  She presents to be seen because of yellowish-green discharge that is been going on for quite a while.  Primary care has prescribed Flagyl on multiple occasions.  Sometimes it spinning cream form and other times its in pill form.  She had a prior hysterectomy along with an exploratory laparotomy to remove what turned out to be a hydrosalpinx.  It appears from reviewing records her ovaries remain.  About 4 years past she had a lot of hot flashes and night sweats.  This is still there but they are improving with time.    When she was at the emergency room and entire work-up was done including STD swabs which were clear.  Urinalysis was unremarkable.    OTHER THINGS SHE WANTS TO DISCUSS TODAY:  Nothing else    The following portions of the patient's history were reviewed and updated as appropriate:current medications and allergies    Social History    Tobacco Use      Smoking status: Current Every Day Smoker        Packs/day: 0.50        Types: Cigarettes        Start date:       Smokeless tobacco: Never Used    Review of Systems  Constitutional POS: nothing reported    NEG: anorexia or night sweats   Genitourinary POS: nothing reported    NEG: dysuria or hematuria   Gastointestinal POS: nothing reported    NEG: bloating, change in bowel habits, melena or reflux symptoms   Integument POS: nothing reported    NEG: moles that are changing in size, shape, color or rashes   Breast POS: nothing reported    NEG: persistent breast lump, skin dimpling or nipple discharge         Objective   /76   Resp 14   Wt 86.2 kg (190 lb)   LMP  (LMP Unknown)   Breastfeeding? No   BMI 34.74 kg/m²       Lab Review   Information [facility] reviewed    Imaging   No data reviewed        Assessment   1. Vaginal discharge most consistent with  atrophic vaginitis     Plan   1. Empiric trial of Estrace cream and reassess in the next 3 weeks.  If this is doing well might consider changing over to oral medication such as Osphena  2. The following data needs to be obtained to update her medical records: last mammogram and last colonoscopy.  3. The importance of keeping all planned follow-up and taking all medications as prescribed was emphasized.  4. Follow up for recheck of symptoms 3-4 weeks      New Medications Ordered This Visit   Medications   • estradiol (ESTRACE VAGINAL) 0.1 MG/GM vaginal cream     Sig: Insert 1 gm nightly for 2 weeks and then 2-3 times per week thereafter     Dispense:  1 each     Refill:  12     Group # [id number]; ID # [id number]     Total time spent today with Rosalia  was 35 minutes (level 3).  Off this time, 80% was spent face-to-face time coordinating care, answering her questions and counseling regarding pathophysiology of her presenting problem along with plans for any diagnositc work-up and treatment.         This note was electronically signed.    Ronald Becerra M.D.  July 29, 2019    Note: Speech recognition transcription software may have been used to create portions of this document.  An attempt at proofreading has been made but errors in transcription could still be present.

## 2019-08-19 ENCOUNTER — OFFICE VISIT (OUTPATIENT)
Dept: OBSTETRICS AND GYNECOLOGY | Facility: CLINIC | Age: 54
End: 2019-08-19

## 2019-08-19 VITALS — WEIGHT: 190 LBS | RESPIRATION RATE: 14 BRPM | BODY MASS INDEX: 34.74 KG/M2

## 2019-08-19 DIAGNOSIS — N95.0 POSTMENOPAUSAL BLEEDING: Primary | ICD-10-CM

## 2019-08-19 PROCEDURE — 99213 OFFICE O/P EST LOW 20 MIN: CPT | Performed by: OBSTETRICS & GYNECOLOGY

## 2019-08-19 RX ORDER — BUPROPION HYDROCHLORIDE 150 MG/1
TABLET, EXTENDED RELEASE ORAL
COMMUNITY
Start: 2019-08-13 | End: 2020-06-10

## 2019-08-19 NOTE — PROGRESS NOTES
Subjective   Chief Complaint   Patient presents with   • Vaginal Bleeding     with brown discharge     Rosalia Langston is a 54 y.o. year old .  No LMP recorded (lmp unknown). Patient has had a hysterectomy.  She presents to be seen because of ongoing vaginal discharge.  When she was last here estrogen cream was prescribed.  She is did nightly for about a week's time and then issues to 3 times per week since.  There is been no significant change in the discharge and in fact it may have gotten a little bit heavier at times.  She had a prior hysterectomy done when she was younger at the time of the  birth.  The bleeding did not occur until roughly  following placement of a mid urethral sling.    OTHER THINGS SHE WANTS TO DISCUSS TODAY:  Nothing else    The following portions of the patient's history were reviewed and updated as appropriate:current medications and allergies    Social History    Tobacco Use      Smoking status: Current Every Day Smoker        Packs/day: 0.50        Types: Cigarettes        Start date:       Smokeless tobacco: Never Used    Review of Systems  Constitutional POS: nothing reported    NEG: anorexia or night sweats   Genitourinary POS: nothing reported    NEG: dysuria or hematuria   Gastointestinal POS: nothing reported    NEG: bloating, change in bowel habits, melena or reflux symptoms   Integument POS: nothing reported    NEG: moles that are changing in size, shape, color or rashes   Breast POS: nothing reported    NEG: persistent breast lump, skin dimpling or nipple discharge         Objective   Resp 14   Wt 86.2 kg (190 lb)   LMP  (LMP Unknown)   Breastfeeding? No   BMI 34.74 kg/m²     General:  well developed; well nourished  no acute distress   Pelvis: Clinical staff was present for exam  External genitalia:  normal appearance of the external genitalia including Bartholin's and Onekama's glands.  :  urethral meatus normal;  Vaginal:  normal pink mucosa without  prolapse or lesions.  There is no evidence of retained cervix at the vaginal apex.  There are no areas of obvious granulation tissue or exposed mesh in the lower vagina in the region of the mid urethral sling     Lab Review   No data reviewed    Imaging   No data reviewed        Assessment   1. Postmenopausal spotting refractory to estrogen cream.  History is highly suggestive of mesh exposure but I can objectively see no evidence of that today.  I suspect the etiology of this and no increase his morbidity or mortality for her.     Plan   1. I think it is conceivable that continue to estrogen cream for the next several weeks may improve her situation but if after 3 months of use there is no improvement I think further use are not help.  2. The importance of keeping all planned follow-up and taking all medications as prescribed was emphasized.  3. Follow up PRN          This note was electronically signed.    Ronald Becerra M.D.  August 19, 2019    Note: Speech recognition transcription software may have been used to create portions of this document.  An attempt at proofreading has been made but errors in transcription could still be present.

## 2020-06-10 ENCOUNTER — HOSPITAL ENCOUNTER (OUTPATIENT)
Facility: HOSPITAL | Age: 55
Setting detail: OBSERVATION
Discharge: HOME | End: 2020-06-11
Attending: EMERGENCY MEDICINE | Admitting: INTERNAL MEDICINE

## 2020-06-10 DIAGNOSIS — E11.10 DIABETIC KETOACIDOSIS WITHOUT COMA ASSOCIATED WITH TYPE 2 DIABETES MELLITUS (HCC): Primary | ICD-10-CM

## 2020-06-10 DIAGNOSIS — R73.9 HYPERGLYCEMIA: ICD-10-CM

## 2020-06-10 PROBLEM — K21.9 GERD (GASTROESOPHAGEAL REFLUX DISEASE): Status: ACTIVE | Noted: 2020-06-10

## 2020-06-10 LAB
ALBUMIN SERPL-MCNC: 4.6 G/DL (ref 3.5–5.2)
ALBUMIN/GLOB SERPL: 1.6 G/DL
ALP SERPL-CCNC: 135 U/L (ref 39–117)
ALT SERPL W P-5'-P-CCNC: 22 U/L (ref 1–33)
ANION GAP SERPL CALCULATED.3IONS-SCNC: 17 MMOL/L (ref 5–15)
AST SERPL-CCNC: 15 U/L (ref 1–32)
B-OH-BUTYR SERPL-SCNC: 1 MMOL/L (ref 0.02–0.27)
BASOPHILS # BLD AUTO: 0.04 10*3/MM3 (ref 0–0.2)
BASOPHILS NFR BLD AUTO: 0.5 % (ref 0–1.5)
BILIRUB SERPL-MCNC: 0.4 MG/DL (ref 0.2–1.2)
BUN BLD-MCNC: 25 MG/DL (ref 6–20)
BUN/CREAT SERPL: 26.6 (ref 7–25)
CALCIUM SPEC-SCNC: 10.5 MG/DL (ref 8.6–10.5)
CHLORIDE SERPL-SCNC: 91 MMOL/L (ref 98–107)
CO2 SERPL-SCNC: 23 MMOL/L (ref 22–29)
CREAT BLD-MCNC: 0.94 MG/DL (ref 0.57–1)
DEPRECATED RDW RBC AUTO: 39.2 FL (ref 37–54)
EOSINOPHIL # BLD AUTO: 0.12 10*3/MM3 (ref 0–0.4)
EOSINOPHIL NFR BLD AUTO: 1.4 % (ref 0.3–6.2)
ERYTHROCYTE [DISTWIDTH] IN BLOOD BY AUTOMATED COUNT: 13.4 % (ref 12.3–15.4)
GFR SERPL CREATININE-BSD FRML MDRD: 75 ML/MIN/1.73
GLOBULIN UR ELPH-MCNC: 2.9 GM/DL
GLUCOSE BLD-MCNC: 402 MG/DL (ref 65–99)
GLUCOSE BLDC GLUCOMTR-MCNC: 348 MG/DL (ref 70–130)
GLUCOSE BLDC GLUCOMTR-MCNC: 371 MG/DL (ref 70–130)
HBA1C MFR BLD: 9.9 % (ref 4.8–5.6)
HCT VFR BLD AUTO: 46.7 % (ref 34–46.6)
HGB BLD-MCNC: 15.5 G/DL (ref 12–15.9)
HOLD SPECIMEN: NORMAL
HOLD SPECIMEN: NORMAL
IMM GRANULOCYTES # BLD AUTO: 0.02 10*3/MM3 (ref 0–0.05)
IMM GRANULOCYTES NFR BLD AUTO: 0.2 % (ref 0–0.5)
LYMPHOCYTES # BLD AUTO: 2.96 10*3/MM3 (ref 0.7–3.1)
LYMPHOCYTES NFR BLD AUTO: 34.4 % (ref 19.6–45.3)
MAGNESIUM SERPL-MCNC: 2.1 MG/DL (ref 1.6–2.6)
MCH RBC QN AUTO: 27 PG (ref 26.6–33)
MCHC RBC AUTO-ENTMCNC: 33.2 G/DL (ref 31.5–35.7)
MCV RBC AUTO: 81.2 FL (ref 79–97)
MONOCYTES # BLD AUTO: 0.67 10*3/MM3 (ref 0.1–0.9)
MONOCYTES NFR BLD AUTO: 7.8 % (ref 5–12)
NEUTROPHILS # BLD AUTO: 4.8 10*3/MM3 (ref 1.7–7)
NEUTROPHILS NFR BLD AUTO: 55.7 % (ref 42.7–76)
NRBC BLD AUTO-RTO: 0 /100 WBC (ref 0–0.2)
PHOSPHATE SERPL-MCNC: 3.3 MG/DL (ref 2.5–4.5)
PLATELET # BLD AUTO: 307 10*3/MM3 (ref 140–450)
PMV BLD AUTO: 11.2 FL (ref 6–12)
POTASSIUM BLD-SCNC: 4.6 MMOL/L (ref 3.5–5.2)
PROT SERPL-MCNC: 7.5 G/DL (ref 6–8.5)
RBC # BLD AUTO: 5.75 10*6/MM3 (ref 3.77–5.28)
SODIUM BLD-SCNC: 131 MMOL/L (ref 136–145)
TROPONIN T SERPL-MCNC: <0.01 NG/ML (ref 0–0.03)
WBC NRBC COR # BLD: 8.61 10*3/MM3 (ref 3.4–10.8)
WHOLE BLOOD HOLD SPECIMEN: NORMAL
WHOLE BLOOD HOLD SPECIMEN: NORMAL

## 2020-06-10 PROCEDURE — 82962 GLUCOSE BLOOD TEST: CPT

## 2020-06-10 PROCEDURE — 83735 ASSAY OF MAGNESIUM: CPT | Performed by: EMERGENCY MEDICINE

## 2020-06-10 PROCEDURE — 84484 ASSAY OF TROPONIN QUANT: CPT | Performed by: EMERGENCY MEDICINE

## 2020-06-10 PROCEDURE — 83036 HEMOGLOBIN GLYCOSYLATED A1C: CPT | Performed by: EMERGENCY MEDICINE

## 2020-06-10 PROCEDURE — 85025 COMPLETE CBC W/AUTO DIFF WBC: CPT

## 2020-06-10 PROCEDURE — 83930 ASSAY OF BLOOD OSMOLALITY: CPT | Performed by: EMERGENCY MEDICINE

## 2020-06-10 PROCEDURE — 96365 THER/PROPH/DIAG IV INF INIT: CPT

## 2020-06-10 PROCEDURE — 96366 THER/PROPH/DIAG IV INF ADDON: CPT

## 2020-06-10 PROCEDURE — 99220 PR INITIAL OBSERVATION CARE/DAY 70 MINUTES: CPT | Performed by: INTERNAL MEDICINE

## 2020-06-10 PROCEDURE — 99284 EMERGENCY DEPT VISIT MOD MDM: CPT

## 2020-06-10 PROCEDURE — 82010 KETONE BODYS QUAN: CPT

## 2020-06-10 PROCEDURE — 80053 COMPREHEN METABOLIC PANEL: CPT

## 2020-06-10 PROCEDURE — 80048 BASIC METABOLIC PNL TOTAL CA: CPT | Performed by: EMERGENCY MEDICINE

## 2020-06-10 PROCEDURE — 81001 URINALYSIS AUTO W/SCOPE: CPT | Performed by: NURSE PRACTITIONER

## 2020-06-10 PROCEDURE — 84100 ASSAY OF PHOSPHORUS: CPT | Performed by: EMERGENCY MEDICINE

## 2020-06-10 PROCEDURE — G0378 HOSPITAL OBSERVATION PER HR: HCPCS

## 2020-06-10 RX ORDER — SODIUM CHLORIDE 0.9 % (FLUSH) 0.9 %
10 SYRINGE (ML) INJECTION AS NEEDED
Status: DISCONTINUED | OUTPATIENT
Start: 2020-06-10 | End: 2020-06-11 | Stop reason: SDUPTHER

## 2020-06-10 RX ORDER — SODIUM CHLORIDE 9 MG/ML
10 INJECTION, SOLUTION INTRAVENOUS CONTINUOUS PRN
Status: DISCONTINUED | OUTPATIENT
Start: 2020-06-10 | End: 2020-06-11 | Stop reason: HOSPADM

## 2020-06-10 RX ORDER — SODIUM CHLORIDE AND POTASSIUM CHLORIDE 150; 900 MG/100ML; MG/100ML
250 INJECTION, SOLUTION INTRAVENOUS CONTINUOUS PRN
Status: DISCONTINUED | OUTPATIENT
Start: 2020-06-10 | End: 2020-06-11 | Stop reason: SDUPTHER

## 2020-06-10 RX ORDER — SODIUM CHLORIDE 0.9 % (FLUSH) 0.9 %
3 SYRINGE (ML) INJECTION EVERY 12 HOURS SCHEDULED
Status: DISCONTINUED | OUTPATIENT
Start: 2020-06-10 | End: 2020-06-11 | Stop reason: SDUPTHER

## 2020-06-10 RX ORDER — DEXTROSE, SODIUM CHLORIDE, AND POTASSIUM CHLORIDE 5; .45; .15 G/100ML; G/100ML; G/100ML
150 INJECTION INTRAVENOUS CONTINUOUS PRN
Status: DISCONTINUED | OUTPATIENT
Start: 2020-06-10 | End: 2020-06-11 | Stop reason: SDUPTHER

## 2020-06-10 RX ORDER — SODIUM CHLORIDE AND POTASSIUM CHLORIDE 300; 900 MG/100ML; MG/100ML
250 INJECTION, SOLUTION INTRAVENOUS CONTINUOUS PRN
Status: DISCONTINUED | OUTPATIENT
Start: 2020-06-10 | End: 2020-06-11 | Stop reason: SDUPTHER

## 2020-06-10 RX ORDER — POTASSIUM CHLORIDE, DEXTROSE MONOHYDRATE AND SODIUM CHLORIDE 300; 5; 900 MG/100ML; G/100ML; MG/100ML
150 INJECTION, SOLUTION INTRAVENOUS CONTINUOUS PRN
Status: DISCONTINUED | OUTPATIENT
Start: 2020-06-10 | End: 2020-06-11 | Stop reason: SDUPTHER

## 2020-06-10 RX ORDER — SODIUM CHLORIDE AND POTASSIUM CHLORIDE 150; 450 MG/100ML; MG/100ML
250 INJECTION, SOLUTION INTRAVENOUS CONTINUOUS PRN
Status: DISCONTINUED | OUTPATIENT
Start: 2020-06-10 | End: 2020-06-11 | Stop reason: SDUPTHER

## 2020-06-10 RX ORDER — SODIUM CHLORIDE 0.9 % (FLUSH) 0.9 %
10 SYRINGE (ML) INJECTION AS NEEDED
Status: DISCONTINUED | OUTPATIENT
Start: 2020-06-10 | End: 2020-06-11 | Stop reason: HOSPADM

## 2020-06-10 RX ORDER — DEXTROSE, SODIUM CHLORIDE, AND POTASSIUM CHLORIDE 5; .9; .15 G/100ML; G/100ML; G/100ML
150 INJECTION INTRAVENOUS CONTINUOUS PRN
Status: DISCONTINUED | OUTPATIENT
Start: 2020-06-10 | End: 2020-06-11 | Stop reason: SDUPTHER

## 2020-06-10 RX ORDER — DEXTROSE AND SODIUM CHLORIDE 5; .9 G/100ML; G/100ML
150 INJECTION, SOLUTION INTRAVENOUS CONTINUOUS PRN
Status: DISCONTINUED | OUTPATIENT
Start: 2020-06-10 | End: 2020-06-11 | Stop reason: SDUPTHER

## 2020-06-10 RX ORDER — DEXTROSE MONOHYDRATE 25 G/50ML
12.5 INJECTION, SOLUTION INTRAVENOUS AS NEEDED
Status: DISCONTINUED | OUTPATIENT
Start: 2020-06-10 | End: 2020-06-11 | Stop reason: SDUPTHER

## 2020-06-10 RX ORDER — SODIUM CHLORIDE 9 MG/ML
250 INJECTION, SOLUTION INTRAVENOUS CONTINUOUS PRN
Status: DISCONTINUED | OUTPATIENT
Start: 2020-06-10 | End: 2020-06-11 | Stop reason: SDUPTHER

## 2020-06-10 RX ORDER — SODIUM CHLORIDE 0.9 % (FLUSH) 0.9 %
10 SYRINGE (ML) INJECTION ONCE AS NEEDED
Status: DISCONTINUED | OUTPATIENT
Start: 2020-06-10 | End: 2020-06-11 | Stop reason: HOSPADM

## 2020-06-10 RX ORDER — DEXTROSE, SODIUM CHLORIDE, AND POTASSIUM CHLORIDE 5; .45; .3 G/100ML; G/100ML; G/100ML
150 INJECTION INTRAVENOUS CONTINUOUS PRN
Status: DISCONTINUED | OUTPATIENT
Start: 2020-06-10 | End: 2020-06-11 | Stop reason: SDUPTHER

## 2020-06-10 RX ORDER — DEXTROSE AND SODIUM CHLORIDE 5; .45 G/100ML; G/100ML
150 INJECTION, SOLUTION INTRAVENOUS CONTINUOUS PRN
Status: DISCONTINUED | OUTPATIENT
Start: 2020-06-10 | End: 2020-06-11 | Stop reason: SDUPTHER

## 2020-06-10 RX ORDER — SODIUM CHLORIDE 450 MG/100ML
250 INJECTION, SOLUTION INTRAVENOUS CONTINUOUS PRN
Status: DISCONTINUED | OUTPATIENT
Start: 2020-06-10 | End: 2020-06-11 | Stop reason: SDUPTHER

## 2020-06-10 RX ADMIN — SODIUM CHLORIDE 1000 ML: 9 INJECTION, SOLUTION INTRAVENOUS at 23:42

## 2020-06-10 RX ADMIN — INSULIN HUMAN 8 UNITS/HR: 1 INJECTION, SOLUTION INTRAVENOUS at 23:52

## 2020-06-10 RX ADMIN — SODIUM CHLORIDE, PRESERVATIVE FREE 3 ML: 5 INJECTION INTRAVENOUS at 23:42

## 2020-06-11 ENCOUNTER — APPOINTMENT (OUTPATIENT)
Dept: GENERAL RADIOLOGY | Facility: HOSPITAL | Age: 55
End: 2020-06-11

## 2020-06-11 VITALS
TEMPERATURE: 98.4 F | RESPIRATION RATE: 18 BRPM | DIASTOLIC BLOOD PRESSURE: 71 MMHG | BODY MASS INDEX: 33.36 KG/M2 | SYSTOLIC BLOOD PRESSURE: 120 MMHG | WEIGHT: 188.3 LBS | HEIGHT: 63 IN | HEART RATE: 71 BPM | OXYGEN SATURATION: 99 %

## 2020-06-11 PROBLEM — K59.00 ACUTE CONSTIPATION: Status: ACTIVE | Noted: 2020-06-11

## 2020-06-11 LAB
ANION GAP SERPL CALCULATED.3IONS-SCNC: 11 MMOL/L (ref 5–15)
ANION GAP SERPL CALCULATED.3IONS-SCNC: 16 MMOL/L (ref 5–15)
ANION GAP SERPL CALCULATED.3IONS-SCNC: 9 MMOL/L (ref 5–15)
ATMOSPHERIC PRESS: ABNORMAL MM[HG]
BACTERIA UR QL AUTO: ABNORMAL /HPF
BASE EXCESS BLDV CALC-SCNC: 1 MMOL/L (ref -2–2)
BASOPHILS # BLD AUTO: 0.03 10*3/MM3 (ref 0–0.2)
BASOPHILS NFR BLD AUTO: 0.4 % (ref 0–1.5)
BDY SITE: ABNORMAL
BILIRUB UR QL STRIP: NEGATIVE
BODY TEMPERATURE: 37 C
BUN BLD-MCNC: 17 MG/DL (ref 6–20)
BUN BLD-MCNC: 18 MG/DL (ref 6–20)
BUN BLD-MCNC: 25 MG/DL (ref 6–20)
BUN/CREAT SERPL: 22.4 (ref 7–25)
BUN/CREAT SERPL: 23.4 (ref 7–25)
BUN/CREAT SERPL: 25 (ref 7–25)
CALCIUM SPEC-SCNC: 10.1 MG/DL (ref 8.6–10.5)
CALCIUM SPEC-SCNC: 8.2 MG/DL (ref 8.6–10.5)
CALCIUM SPEC-SCNC: 8.6 MG/DL (ref 8.6–10.5)
CHLORIDE SERPL-SCNC: 100 MMOL/L (ref 98–107)
CHLORIDE SERPL-SCNC: 105 MMOL/L (ref 98–107)
CHLORIDE SERPL-SCNC: 92 MMOL/L (ref 98–107)
CLARITY UR: ABNORMAL
CO2 BLDA-SCNC: 27.2 MMOL/L (ref 22–33)
CO2 SERPL-SCNC: 22 MMOL/L (ref 22–29)
CO2 SERPL-SCNC: 23 MMOL/L (ref 22–29)
CO2 SERPL-SCNC: 25 MMOL/L (ref 22–29)
COHGB MFR BLD: 2.6 %
COLOR UR: YELLOW
CREAT BLD-MCNC: 0.76 MG/DL (ref 0.57–1)
CREAT BLD-MCNC: 0.77 MG/DL (ref 0.57–1)
CREAT BLD-MCNC: 1 MG/DL (ref 0.57–1)
DEPRECATED RDW RBC AUTO: 42 FL (ref 37–54)
EOSINOPHIL # BLD AUTO: 0.15 10*3/MM3 (ref 0–0.4)
EOSINOPHIL NFR BLD AUTO: 2 % (ref 0.3–6.2)
EPAP: 0
ERYTHROCYTE [DISTWIDTH] IN BLOOD BY AUTOMATED COUNT: 13.6 % (ref 12.3–15.4)
GFR SERPL CREATININE-BSD FRML MDRD: 70 ML/MIN/1.73
GFR SERPL CREATININE-BSD FRML MDRD: 94 ML/MIN/1.73
GFR SERPL CREATININE-BSD FRML MDRD: 96 ML/MIN/1.73
GLUCOSE BLD-MCNC: 130 MG/DL (ref 65–99)
GLUCOSE BLD-MCNC: 160 MG/DL (ref 65–99)
GLUCOSE BLD-MCNC: 382 MG/DL (ref 65–99)
GLUCOSE BLDC GLUCOMTR-MCNC: 106 MG/DL (ref 70–130)
GLUCOSE BLDC GLUCOMTR-MCNC: 131 MG/DL (ref 70–130)
GLUCOSE BLDC GLUCOMTR-MCNC: 133 MG/DL (ref 70–130)
GLUCOSE BLDC GLUCOMTR-MCNC: 157 MG/DL (ref 70–130)
GLUCOSE BLDC GLUCOMTR-MCNC: 175 MG/DL (ref 70–130)
GLUCOSE BLDC GLUCOMTR-MCNC: 265 MG/DL (ref 70–130)
GLUCOSE BLDC GLUCOMTR-MCNC: 265 MG/DL (ref 70–130)
GLUCOSE BLDC GLUCOMTR-MCNC: 361 MG/DL (ref 70–130)
GLUCOSE BLDC GLUCOMTR-MCNC: 382 MG/DL (ref 70–130)
GLUCOSE BLDC GLUCOMTR-MCNC: 93 MG/DL (ref 70–130)
GLUCOSE UR STRIP-MCNC: ABNORMAL MG/DL
HCO3 BLDV-SCNC: 25.9 MMOL/L (ref 22–28)
HCT VFR BLD AUTO: 40 % (ref 34–46.6)
HGB BLD-MCNC: 13 G/DL (ref 12–15.9)
HGB BLDA-MCNC: 14.2 G/DL (ref 14–18)
HGB UR QL STRIP.AUTO: ABNORMAL
HOROWITZ INDEX BLD+IHG-RTO: 21 %
HYALINE CASTS UR QL AUTO: ABNORMAL /LPF
IMM GRANULOCYTES # BLD AUTO: 0.02 10*3/MM3 (ref 0–0.05)
IMM GRANULOCYTES NFR BLD AUTO: 0.3 % (ref 0–0.5)
IPAP: 0
KETONES UR QL STRIP: ABNORMAL
LEUKOCYTE ESTERASE UR QL STRIP.AUTO: ABNORMAL
LYMPHOCYTES # BLD AUTO: 2.52 10*3/MM3 (ref 0.7–3.1)
LYMPHOCYTES NFR BLD AUTO: 34.2 % (ref 19.6–45.3)
MAGNESIUM SERPL-MCNC: 1.9 MG/DL (ref 1.6–2.6)
MAGNESIUM SERPL-MCNC: 2 MG/DL (ref 1.6–2.6)
MCH RBC QN AUTO: 27.3 PG (ref 26.6–33)
MCHC RBC AUTO-ENTMCNC: 32.5 G/DL (ref 31.5–35.7)
MCV RBC AUTO: 84 FL (ref 79–97)
METHGB BLD QL: 0.9 %
MODALITY: ABNORMAL
MONOCYTES # BLD AUTO: 0.82 10*3/MM3 (ref 0.1–0.9)
MONOCYTES NFR BLD AUTO: 11.1 % (ref 5–12)
NEUTROPHILS # BLD AUTO: 3.83 10*3/MM3 (ref 1.7–7)
NEUTROPHILS NFR BLD AUTO: 52 % (ref 42.7–76)
NITRITE UR QL STRIP: NEGATIVE
NOTE: ABNORMAL
NRBC BLD AUTO-RTO: 0 /100 WBC (ref 0–0.2)
OSMOLALITY SERPL: 303 MOSM/KG (ref 275–295)
OXYHGB MFR BLDV: 86.4 %
PAW @ PEAK INSP FLOW SETTING VENT: 0 CMH2O
PCO2 BLDV: 41.3 MM HG (ref 41–51)
PH BLDV: 7.41 PH UNITS
PH UR STRIP.AUTO: <=5 [PH] (ref 5–8)
PHOSPHATE SERPL-MCNC: 2.7 MG/DL (ref 2.5–4.5)
PHOSPHATE SERPL-MCNC: 3 MG/DL (ref 2.5–4.5)
PHOSPHATE SERPL-MCNC: 3 MG/DL (ref 2.5–4.5)
PLATELET # BLD AUTO: 245 10*3/MM3 (ref 140–450)
PMV BLD AUTO: 11.5 FL (ref 6–12)
PO2 BLDV: 55 MM HG (ref 27–53)
POTASSIUM BLD-SCNC: 3.5 MMOL/L (ref 3.5–5.2)
POTASSIUM BLD-SCNC: 3.7 MMOL/L (ref 3.5–5.2)
POTASSIUM BLD-SCNC: 4.1 MMOL/L (ref 3.5–5.2)
PROT UR QL STRIP: NEGATIVE
RBC # BLD AUTO: 4.76 10*6/MM3 (ref 3.77–5.28)
RBC # UR: ABNORMAL /HPF
REF LAB TEST METHOD: ABNORMAL
SODIUM BLD-SCNC: 131 MMOL/L (ref 136–145)
SODIUM BLD-SCNC: 136 MMOL/L (ref 136–145)
SODIUM BLD-SCNC: 136 MMOL/L (ref 136–145)
SP GR UR STRIP: 1.03 (ref 1–1.03)
SQUAMOUS #/AREA URNS HPF: ABNORMAL /HPF
TOTAL RATE: 0 BREATHS/MINUTE
UROBILINOGEN UR QL STRIP: ABNORMAL
WBC NRBC COR # BLD: 7.37 10*3/MM3 (ref 3.4–10.8)
WBC UR QL AUTO: ABNORMAL /HPF
YEAST URNS QL MICRO: ABNORMAL /HPF

## 2020-06-11 PROCEDURE — 25010000002 ONDANSETRON PER 1 MG: Performed by: NURSE PRACTITIONER

## 2020-06-11 PROCEDURE — G0378 HOSPITAL OBSERVATION PER HR: HCPCS

## 2020-06-11 PROCEDURE — 82820 HEMOGLOBIN-OXYGEN AFFINITY: CPT

## 2020-06-11 PROCEDURE — 25010000002 HEPARIN (PORCINE) PER 1000 UNITS: Performed by: NURSE PRACTITIONER

## 2020-06-11 PROCEDURE — 82962 GLUCOSE BLOOD TEST: CPT

## 2020-06-11 PROCEDURE — 63710000001 INSULIN LISPRO (HUMAN) PER 5 UNITS: Performed by: INTERNAL MEDICINE

## 2020-06-11 PROCEDURE — 96372 THER/PROPH/DIAG INJ SC/IM: CPT

## 2020-06-11 PROCEDURE — 96366 THER/PROPH/DIAG IV INF ADDON: CPT

## 2020-06-11 PROCEDURE — 71046 X-RAY EXAM CHEST 2 VIEWS: CPT

## 2020-06-11 PROCEDURE — 85025 COMPLETE CBC W/AUTO DIFF WBC: CPT | Performed by: NURSE PRACTITIONER

## 2020-06-11 PROCEDURE — 84100 ASSAY OF PHOSPHORUS: CPT | Performed by: EMERGENCY MEDICINE

## 2020-06-11 PROCEDURE — 96368 THER/DIAG CONCURRENT INF: CPT

## 2020-06-11 PROCEDURE — 83735 ASSAY OF MAGNESIUM: CPT | Performed by: NURSE PRACTITIONER

## 2020-06-11 PROCEDURE — 63710000001 INSULIN DETEMIR PER 5 UNITS: Performed by: INTERNAL MEDICINE

## 2020-06-11 PROCEDURE — G0108 DIAB MANAGE TRN  PER INDIV: HCPCS

## 2020-06-11 PROCEDURE — 80048 BASIC METABOLIC PNL TOTAL CA: CPT | Performed by: EMERGENCY MEDICINE

## 2020-06-11 PROCEDURE — 25010000003 POTASSIUM CHLORIDE PER 2 MEQ: Performed by: NURSE PRACTITIONER

## 2020-06-11 PROCEDURE — 93010 ELECTROCARDIOGRAM REPORT: CPT | Performed by: INTERNAL MEDICINE

## 2020-06-11 PROCEDURE — 82805 BLOOD GASES W/O2 SATURATION: CPT

## 2020-06-11 PROCEDURE — 96375 TX/PRO/DX INJ NEW DRUG ADDON: CPT

## 2020-06-11 PROCEDURE — 99217 PR OBSERVATION CARE DISCHARGE MANAGEMENT: CPT | Performed by: INTERNAL MEDICINE

## 2020-06-11 PROCEDURE — 93005 ELECTROCARDIOGRAM TRACING: CPT | Performed by: NURSE PRACTITIONER

## 2020-06-11 PROCEDURE — 25810000003 SODIUM CHLORIDE 0.9 % WITH KCL 20 MEQ 20-0.9 MEQ/L-% SOLUTION: Performed by: NURSE PRACTITIONER

## 2020-06-11 RX ORDER — SODIUM CHLORIDE 0.9 % (FLUSH) 0.9 %
10 SYRINGE (ML) INJECTION AS NEEDED
Status: DISCONTINUED | OUTPATIENT
Start: 2020-06-11 | End: 2020-06-11 | Stop reason: HOSPADM

## 2020-06-11 RX ORDER — ONDANSETRON 2 MG/ML
4 INJECTION INTRAMUSCULAR; INTRAVENOUS EVERY 6 HOURS PRN
Status: DISCONTINUED | OUTPATIENT
Start: 2020-06-11 | End: 2020-06-11 | Stop reason: HOSPADM

## 2020-06-11 RX ORDER — DEXTROSE MONOHYDRATE 25 G/50ML
25 INJECTION, SOLUTION INTRAVENOUS
Status: DISCONTINUED | OUTPATIENT
Start: 2020-06-11 | End: 2020-06-11 | Stop reason: HOSPADM

## 2020-06-11 RX ORDER — ACETAMINOPHEN 650 MG/1
650 SUPPOSITORY RECTAL EVERY 4 HOURS PRN
Status: DISCONTINUED | OUTPATIENT
Start: 2020-06-11 | End: 2020-06-11 | Stop reason: HOSPADM

## 2020-06-11 RX ORDER — SODIUM CHLORIDE 0.9 % (FLUSH) 0.9 %
3 SYRINGE (ML) INJECTION EVERY 12 HOURS SCHEDULED
Status: DISCONTINUED | OUTPATIENT
Start: 2020-06-11 | End: 2020-06-11 | Stop reason: HOSPADM

## 2020-06-11 RX ORDER — SODIUM CHLORIDE 450 MG/100ML
250 INJECTION, SOLUTION INTRAVENOUS CONTINUOUS PRN
Status: DISCONTINUED | OUTPATIENT
Start: 2020-06-11 | End: 2020-06-11

## 2020-06-11 RX ORDER — SODIUM CHLORIDE AND POTASSIUM CHLORIDE 150; 450 MG/100ML; MG/100ML
250 INJECTION, SOLUTION INTRAVENOUS CONTINUOUS PRN
Status: DISCONTINUED | OUTPATIENT
Start: 2020-06-11 | End: 2020-06-11

## 2020-06-11 RX ORDER — LISINOPRIL 20 MG/1
40 TABLET ORAL
Status: DISCONTINUED | OUTPATIENT
Start: 2020-06-11 | End: 2020-06-11 | Stop reason: HOSPADM

## 2020-06-11 RX ORDER — ACETAMINOPHEN 160 MG/5ML
650 SOLUTION ORAL EVERY 4 HOURS PRN
Status: DISCONTINUED | OUTPATIENT
Start: 2020-06-11 | End: 2020-06-11 | Stop reason: HOSPADM

## 2020-06-11 RX ORDER — DEXTROSE AND SODIUM CHLORIDE 5; .45 G/100ML; G/100ML
150 INJECTION, SOLUTION INTRAVENOUS CONTINUOUS PRN
Status: DISCONTINUED | OUTPATIENT
Start: 2020-06-11 | End: 2020-06-11

## 2020-06-11 RX ORDER — DEXTROSE, SODIUM CHLORIDE, AND POTASSIUM CHLORIDE 5; .9; .15 G/100ML; G/100ML; G/100ML
150 INJECTION INTRAVENOUS CONTINUOUS PRN
Status: DISCONTINUED | OUTPATIENT
Start: 2020-06-11 | End: 2020-06-11

## 2020-06-11 RX ORDER — DEXTROSE, SODIUM CHLORIDE, AND POTASSIUM CHLORIDE 5; .45; .3 G/100ML; G/100ML; G/100ML
150 INJECTION INTRAVENOUS CONTINUOUS PRN
Status: DISCONTINUED | OUTPATIENT
Start: 2020-06-11 | End: 2020-06-11

## 2020-06-11 RX ORDER — HEPARIN SODIUM 5000 [USP'U]/ML
5000 INJECTION, SOLUTION INTRAVENOUS; SUBCUTANEOUS EVERY 8 HOURS SCHEDULED
Status: DISCONTINUED | OUTPATIENT
Start: 2020-06-11 | End: 2020-06-11 | Stop reason: HOSPADM

## 2020-06-11 RX ORDER — SODIUM CHLORIDE 0.9 % (FLUSH) 0.9 %
10 SYRINGE (ML) INJECTION EVERY 12 HOURS SCHEDULED
Status: DISCONTINUED | OUTPATIENT
Start: 2020-06-11 | End: 2020-06-11 | Stop reason: HOSPADM

## 2020-06-11 RX ORDER — FAMOTIDINE 10 MG
20 TABLET ORAL 2 TIMES DAILY
Qty: 120 TABLET | Refills: 0 | OUTPATIENT
Start: 2020-06-11 | End: 2021-11-13

## 2020-06-11 RX ORDER — PAROXETINE HYDROCHLORIDE 20 MG/1
20 TABLET, FILM COATED ORAL EVERY MORNING
Status: DISCONTINUED | OUTPATIENT
Start: 2020-06-11 | End: 2020-06-11 | Stop reason: HOSPADM

## 2020-06-11 RX ORDER — NICOTINE POLACRILEX 4 MG
15 LOZENGE BUCCAL
Status: DISCONTINUED | OUTPATIENT
Start: 2020-06-11 | End: 2020-06-11 | Stop reason: HOSPADM

## 2020-06-11 RX ORDER — DEXTROSE MONOHYDRATE 25 G/50ML
12.5 INJECTION, SOLUTION INTRAVENOUS AS NEEDED
Status: DISCONTINUED | OUTPATIENT
Start: 2020-06-11 | End: 2020-06-11 | Stop reason: HOSPADM

## 2020-06-11 RX ORDER — METFORMIN HYDROCHLORIDE 500 MG/1
500 TABLET, EXTENDED RELEASE ORAL
Qty: 30 TABLET | Refills: 0 | OUTPATIENT
Start: 2020-06-11 | End: 2021-11-13

## 2020-06-11 RX ORDER — ONDANSETRON 4 MG/1
4 TABLET, FILM COATED ORAL EVERY 6 HOURS PRN
Status: DISCONTINUED | OUTPATIENT
Start: 2020-06-11 | End: 2020-06-11 | Stop reason: HOSPADM

## 2020-06-11 RX ORDER — SODIUM CHLORIDE AND POTASSIUM CHLORIDE 300; 900 MG/100ML; MG/100ML
250 INJECTION, SOLUTION INTRAVENOUS CONTINUOUS PRN
Status: DISCONTINUED | OUTPATIENT
Start: 2020-06-11 | End: 2020-06-11

## 2020-06-11 RX ORDER — DEXTROSE, SODIUM CHLORIDE, AND POTASSIUM CHLORIDE 5; .45; .15 G/100ML; G/100ML; G/100ML
150 INJECTION INTRAVENOUS CONTINUOUS PRN
Status: DISCONTINUED | OUTPATIENT
Start: 2020-06-11 | End: 2020-06-11

## 2020-06-11 RX ORDER — ACETAMINOPHEN 325 MG/1
650 TABLET ORAL EVERY 4 HOURS PRN
Status: DISCONTINUED | OUTPATIENT
Start: 2020-06-11 | End: 2020-06-11 | Stop reason: HOSPADM

## 2020-06-11 RX ORDER — FAMOTIDINE 10 MG/ML
20 INJECTION, SOLUTION INTRAVENOUS EVERY 12 HOURS SCHEDULED
Status: DISCONTINUED | OUTPATIENT
Start: 2020-06-11 | End: 2020-06-11 | Stop reason: HOSPADM

## 2020-06-11 RX ORDER — SODIUM CHLORIDE AND POTASSIUM CHLORIDE 150; 900 MG/100ML; MG/100ML
250 INJECTION, SOLUTION INTRAVENOUS CONTINUOUS PRN
Status: DISCONTINUED | OUTPATIENT
Start: 2020-06-11 | End: 2020-06-11

## 2020-06-11 RX ORDER — SODIUM CHLORIDE 9 MG/ML
10 INJECTION, SOLUTION INTRAVENOUS CONTINUOUS PRN
Status: DISCONTINUED | OUTPATIENT
Start: 2020-06-11 | End: 2020-06-11 | Stop reason: HOSPADM

## 2020-06-11 RX ORDER — SODIUM CHLORIDE 9 MG/ML
250 INJECTION, SOLUTION INTRAVENOUS CONTINUOUS PRN
Status: DISCONTINUED | OUTPATIENT
Start: 2020-06-11 | End: 2020-06-11

## 2020-06-11 RX ORDER — AMOXICILLIN 250 MG
2 CAPSULE ORAL 2 TIMES DAILY
Status: DISCONTINUED | OUTPATIENT
Start: 2020-06-11 | End: 2020-06-11 | Stop reason: HOSPADM

## 2020-06-11 RX ORDER — SODIUM CHLORIDE 0.9 % (FLUSH) 0.9 %
10 SYRINGE (ML) INJECTION ONCE AS NEEDED
Status: DISCONTINUED | OUTPATIENT
Start: 2020-06-11 | End: 2020-06-11 | Stop reason: HOSPADM

## 2020-06-11 RX ORDER — DEXTROSE AND SODIUM CHLORIDE 5; .9 G/100ML; G/100ML
150 INJECTION, SOLUTION INTRAVENOUS CONTINUOUS PRN
Status: DISCONTINUED | OUTPATIENT
Start: 2020-06-11 | End: 2020-06-11

## 2020-06-11 RX ORDER — POTASSIUM CHLORIDE, DEXTROSE MONOHYDRATE AND SODIUM CHLORIDE 300; 5; 900 MG/100ML; G/100ML; MG/100ML
150 INJECTION, SOLUTION INTRAVENOUS CONTINUOUS PRN
Status: DISCONTINUED | OUTPATIENT
Start: 2020-06-11 | End: 2020-06-11

## 2020-06-11 RX ORDER — CHOLECALCIFEROL (VITAMIN D3) 125 MCG
5 CAPSULE ORAL NIGHTLY PRN
Status: DISCONTINUED | OUTPATIENT
Start: 2020-06-11 | End: 2020-06-11 | Stop reason: HOSPADM

## 2020-06-11 RX ADMIN — HEPARIN SODIUM 5000 UNITS: 5000 INJECTION INTRAVENOUS; SUBCUTANEOUS at 15:02

## 2020-06-11 RX ADMIN — ONDANSETRON 4 MG: 2 INJECTION INTRAMUSCULAR; INTRAVENOUS at 11:20

## 2020-06-11 RX ADMIN — FAMOTIDINE 20 MG: 10 INJECTION INTRAVENOUS at 09:26

## 2020-06-11 RX ADMIN — HEPARIN SODIUM 5000 UNITS: 5000 INJECTION INTRAVENOUS; SUBCUTANEOUS at 05:55

## 2020-06-11 RX ADMIN — SENNOSIDES AND DOCUSATE SODIUM 2 TABLET: 8.6; 5 TABLET ORAL at 09:26

## 2020-06-11 RX ADMIN — INSULIN LISPRO 2 UNITS: 100 INJECTION, SOLUTION INTRAVENOUS; SUBCUTANEOUS at 09:26

## 2020-06-11 RX ADMIN — LISINOPRIL 40 MG: 20 TABLET ORAL at 09:26

## 2020-06-11 RX ADMIN — PAROXETINE HYDROCHLORIDE 20 MG: 20 TABLET, FILM COATED ORAL at 09:26

## 2020-06-11 RX ADMIN — POTASSIUM CHLORIDE AND SODIUM CHLORIDE 250 ML/HR: 450; 150 INJECTION, SOLUTION INTRAVENOUS at 05:14

## 2020-06-11 RX ADMIN — INSULIN LISPRO 6 UNITS: 100 INJECTION, SOLUTION INTRAVENOUS; SUBCUTANEOUS at 16:50

## 2020-06-11 RX ADMIN — SODIUM CHLORIDE, PRESERVATIVE FREE 10 ML: 5 INJECTION INTRAVENOUS at 09:25

## 2020-06-11 RX ADMIN — DEXTROSE MONOHYDRATE, SODIUM CHLORIDE, AND POTASSIUM CHLORIDE 150 ML/HR: 50; 9; 1.49 INJECTION, SOLUTION INTRAVENOUS at 02:08

## 2020-06-11 RX ADMIN — INSULIN DETEMIR 10 UNITS: 100 INJECTION, SOLUTION SUBCUTANEOUS at 15:08

## 2020-06-11 RX ADMIN — DEXTROSE MONOHYDRATE 12.5 G: 25 INJECTION, SOLUTION INTRAVENOUS at 03:00

## 2020-06-11 RX ADMIN — POTASSIUM CHLORIDE, DEXTROSE MONOHYDRATE AND SODIUM CHLORIDE 150 ML/HR: 150; 5; 450 INJECTION, SOLUTION INTRAVENOUS at 06:02

## 2020-06-11 RX ADMIN — INSULIN LISPRO 6 UNITS: 100 INJECTION, SOLUTION INTRAVENOUS; SUBCUTANEOUS at 12:37

## 2020-06-11 NOTE — H&P
[facility] Medicine Services  HISTORY AND PHYSICAL    Patient Name: Rosalia Langston  : 1965  MRN: [id number]  Primary Care Physician: Laverne Santa MD  Date of admission: 6/10/2020      Subjective   Subjective     Chief Complaint:  Elevated glucose    HPI:  Rosalia Langston is a 55 y.o. female with a medical history significant for HTN, depression and DM2 who presents to the ER today with elevated glucose. She was diagnosed as prediabetic ~ 3 years ago then was started on metformin ~ 2 weeks ago for a Hgb A1c 9.3, she had f/u apt today and her fingerstick glucose was 454, she was changed to Januvia (has not taken yet, was supposed to start in am) and she was given an insulin injection there; after getting home she began to have nausea and vomiting and was advised to present to the ER. Glucose upon arrival here was 371, beta hydroxybutyrate was 0.999. She also tells me she has had acid reflux symptoms for ~ 2 weeks, she does not take anything at home and she has not had a bowel movement in 2 days. She also tells me she had a recent mesh removal surgery from a prior uretheral sling surgery () and is being followed for care at [facility] for this. She was tested for COVID-19 prior to surgery; she works at [employer] as a nurse [employer]; denies chest pain, palpitations, shortness of breath, fever, chills, loss of taste or smell; she has had a decreased appetite and intermittent nausea with clear frothy emesis. She is having polyuria and polydipsia. She will be admitted to the hospital medicine service for further evaluation and management.      COVID-19 RISK SCREEN     1. Has the patient had close contact without PPE with a lab confirmed COVID-19 (+) person or a person under investigation (PUI) for COVID-19 infection?  -- no     2. Has the patient had respiratory symptoms, worsened/new cough and/or SOA, unexplained fever, or sudden loss of smell and/or taste in the past 7 days?  --  no    3. Does the patient have baseline higher exposure risk such as working in healthcare field, currently residing in healthcare facility, or ongoing hemodialysis?  --  no       Review of Systems   Constitutional: Positive for appetite change and fatigue.   HENT: Negative.    Eyes: Negative.    Respiratory: Negative.    Cardiovascular: Negative.    Gastrointestinal: Positive for nausea and vomiting. Negative for abdominal distention and abdominal pain.   Endocrine: Positive for polydipsia and polyuria.   Genitourinary: Negative.    Musculoskeletal: Negative.    Skin: Negative.    Allergic/Immunologic: Negative.    Neurological: Negative.    Hematological: Negative.    Psychiatric/Behavioral: Negative.         All other systems reviewed and are negative.     Personal History     Past Medical History:   Diagnosis Date   • Diabetes mellitus (CMS/HCC)     HAD BEEN BORDERLINE PREVIOUSLY    • Essential hypertension    • Serrated polyp of colon 2015   • Situational depression after spouse's death        Past Surgical History:   Procedure Laterality Date   • ABDOMINAL HYSTERECTOMY      Done for ruptured uterus at the time of    • ABDOMINAL WALL MESH  REMOVAL      at [facility] r/t previuos uretheral sling   • BLADDER SUSPENSION     • CHOLECYSTECTOMY OPEN     • EXPLORATORY LAPAROTOMY  2009    Exploratory laparotomy with enteral lysis and removal of large left-sided pelvic mass (hydrosalpinx).  Additionally TOT and ureteral stents placed       Family History: family history includes Colon cancer in her paternal uncle; Diabetes in her mother; Ovarian cancer in her paternal aunt. Otherwise pertinent FHx was reviewed and unremarkable.     Social History:  reports that she has been smoking cigarettes. She started smoking about 28 years ago. She has been smoking about 0.50 packs per day. She has never used smokeless tobacco. She reports that she drinks alcohol. She reports that  she does not use drugs.  Social History     Social History Narrative   • Not on file       Medications:  Available home medication information reviewed.    (Not in a hospital admission)    No Known Allergies    Objective   Objective     Vital Signs:   Temp:  [98 °F (36.7 °C)] 98 °F (36.7 °C)  Heart Rate:  [80] 80  Resp:  [18] 18  BP: (150)/(85) 150/85        Physical Exam   Constitutional: She is oriented to person, place, and time. She appears well-developed and well-nourished. No distress.   HENT:   Head: Normocephalic and atraumatic.   Mouth/Throat: No oropharyngeal exudate.   Eyes: Pupils are equal, round, and reactive to light. EOM are normal. No scleral icterus.   Neck: Normal range of motion. Neck supple. No JVD present.   Cardiovascular: Normal rate, regular rhythm, normal heart sounds and intact distal pulses.   No murmur heard.  Pulmonary/Chest: Effort normal and breath sounds normal. No respiratory distress. She has no wheezes. She has no rales.   Abdominal: Soft. Bowel sounds are normal. She exhibits no distension. There is tenderness (mild epigastric). There is no guarding.   Musculoskeletal: Normal range of motion. She exhibits no edema, tenderness or deformity.   Neurological: She is alert and oriented to person, place, and time.   Skin: Capillary refill takes less than 2 seconds. No rash noted. No erythema. No pallor.   Psychiatric: She has a normal mood and affect. Her behavior is normal. Judgment and thought content normal.      Results Reviewed:  I have personally reviewed current lab and radiology data.    Results from last 7 days   Lab Units 06/10/20  2159   WBC 10*3/mm3 8.61   HEMOGLOBIN g/dL 15.5   HEMATOCRIT % 46.7*   PLATELETS 10*3/mm3 307     Results from last 7 days   Lab Units 06/10/20  2342 06/10/20  2159   SODIUM mmol/L 131* 131*   POTASSIUM mmol/L 4.1 4.6   CHLORIDE mmol/L 92* 91*   CO2 mmol/L 23.0 23.0   BUN mg/dL 25* 25*   CREATININE mg/dL 1.00 0.94   GLUCOSE mg/dL 382* 402*      CALCIUM mg/dL 10.1 10.5   ALT (SGPT) U/L  --  22   AST (SGOT) U/L  --  15   TROPONIN T ng/mL  --  <0.010     Estimated Creatinine Clearance: 65.6 mL/min (by C-G formula based on SCr of 1 mg/dL).  Brief Urine Lab Results  (Last result in the past 365 days)      Color   Clarity   Blood   Leuk Est   Nitrite   Protein   CREAT   Urine HCG        07/15/19 0709 Yellow Clear Negative Negative Negative Negative             Imaging Results (Last 24 Hours)     ** No results found for the last 24 hours. **             Assessment/Plan   Assessment & Plan     Active Hospital Problems    Diagnosis POA   • **DKA (diabetic ketoacidoses) (CMS/HCC) [E11.10] Yes   • Acute constipation [K59.00] Unknown   • GERD (gastroesophageal reflux disease) [K21.9] Unknown   • Tobacco dependence [F17.200] Yes   • Essential hypertension [I10] Yes     55 year old female with recent diagnosis of DM2 (has been prediabetic ~ 3 years) with HgbA1c 9.3 and started on metformin ~ 2 weeks ago; switched to Januvia today and was given insulin shot at her PCP for fingerstick 454 there, at home began vomiting and was referred to the ER .    Plan:    DKA, mild:  - admit to tele  - DKA protocol insulin drip, initiated by ER  - 2 liter NS bolus started in ER, will follow fluids per DKA protocol  - bmp, mag, phos Q 4 hours  - check serum osmolality  - check urinalysis  - chest xray  - will try clear liquid diet w/ consistent carb restrictions    GERD:  - add pepcid IV Q 12 hours, change to PO when nausea improves    Acute constipation:  - add senna/dos BID    HTN:  - continue benazepril  - stopped chlorthalidone today by PCP and added amlodipine per patient but she has not started taking this yet, will hold for now and monitor on benazepril    Tobacco dependence:  - tobacco cessation education  - nicotine patch PRN    DVT prophylaxis:  SC Heparin        CODE STATUS:   Code Status and Medical Interventions:   Ordered at: 06/10/20 2175     Code Status:    CPR      Medical Interventions (Level of Support Prior to Arrest):    Full       Admission Status:  I believe this patient meets INPATIENT status due to DKA.  I feel patient’s risk for adverse outcomes and need for care warrant INPATIENT evaluation and I predict the patient’s care encounter to likely last beyond 2 midnights.      Electronically signed by TY Granados, 06/10/20, 11:56 PM.      Attending   Admission Attestation       I have seen and examined the patient, performing an independent face-to-face diagnostic evaluation with plan of care reviewed and developed with the advanced practice clinician (APC).      Brief Summary Statement:   Rosalia Langston is a 55 y.o. female with a PMH significant for recently diagnosed diabetes mellitus, HTN, depression who presents to the ED due to elevated glucose today.  Patient states that for the past 2 weeks she has had dry mouth, excessive thirst, urinary frequency, blurred vision and nausea.  She was seen by her PCP who diagnosed her with diabetes mellitus with hemoglobin A1c of 9.3.  Patient was started on metformin.  She followed up with her PCP today and due to elevated blood glucose in the clinic.  After going back home she began to experience nausea and vomiting.  Due to elevated glucose and vomiting, she was instructed to come to the ED for further evaluation.    Remainder of detailed HPI is as noted by APC and has been reviewed and/or edited by me for completeness.    Attending Physical Exam:  Constitutional: Awake, alert  Eyes: PERRLA, sclerae anicteric, no conjunctival injection  HENT: NCAT, mucous membranes moist  Neck: Supple, no thyromegaly, no lymphadenopathy, trachea midline  Respiratory: Clear to auscultation bilaterally, nonlabored respirations   Cardiovascular: RRR, no murmurs, rubs, or gallops, palpable pedal pulses bilaterally  Gastrointestinal: Positive bowel sounds, soft, nontender, nondistended  Musculoskeletal: No bilateral ankle edema, no clubbing or  cyanosis to extremities  Psychiatric: Appropriate affect, cooperative  Neurologic: Oriented x 3, strength symmetric in all extremities, Cranial Nerves grossly intact to confrontation, speech clear  Skin: No rashes      Brief Assessment/Plan :  See detailed assessment and plan developed with APC which I have reviewed and/or edited for completeness.    Electronically signed by Elyse Delgadillo DO, 06/11/20, 1:13 AM.

## 2020-06-11 NOTE — PROGRESS NOTES
Discharge Planning Assessment  [facility]     Patient Name: Rosalia Langston  MRN: [id number]  Today's Date: 6/11/2020    Admit Date: 6/10/2020    Discharge Needs Assessment     Row Name 06/11/20 0910       Living Environment    Lives With  spouse    Current Living Arrangements  home/apartment/condo    Primary Care Provided by  self    Able to Return to Prior Arrangements  yes       Transition Planning    Patient/Family Anticipates Transition to  home    Transportation Anticipated  family or friend will provide       Discharge Needs Assessment    Readmission Within the Last 30 Days  no previous admission in last 30 days    Concerns to be Addressed  denies needs/concerns at this time    Equipment Currently Used at Home  none    Anticipated Changes Related to Illness  none        Discharge Plan     Row Name 06/11/20 0912       Plan    Plan  home    Patient/Family in Agreement with Plan  yes    Plan Comments  I met with Mrs. Langston at bedside to discuss discharge planning.  Mrs. Langston lives with her  in [facility].  She is independent with all the activities of daily living and is employed full time.  She drives herself.  Mrs. Langston has no DME or home Oxygen.  No discharge needs identified at this time.      Final Discharge Disposition Code  30 - still a patient        Destination      Coordination has not been started for this encounter.      Durable Medical Equipment      Coordination has not been started for this encounter.      Dialysis/Infusion      Coordination has not been started for this encounter.      Home Medical Care      Coordination has not been started for this encounter.      Therapy      Coordination has not been started for this encounter.      Community Resources      Coordination has not been started for this encounter.          Demographic Summary     Row Name 06/11/20 0907       General Information    Admission Type  observation    Reason for Consult  discharge planning    General  Information Comments  I verified with Mrs. Langston that Laverne David Martinez is her primary physician and her primary insurance is [employer] [place] [employer].          Functional Status     Row Name 06/11/20 0910       Functional Status, IADL    Medications  independent    Meal Preparation  independent    Housekeeping  independent    Laundry  independent    Shopping  independent       Employment/    Employment Status  employed full time        Psychosocial    No documentation.       Abuse/Neglect    No documentation.       Legal    No documentation.       Substance Abuse    No documentation.       Patient Forms    No documentation.           Emely Garcia RN

## 2020-06-11 NOTE — DISCHARGE SUMMARY
"    [facility] Medicine Services  DISCHARGE SUMMARY    Patient Name: Rosalia Langston  : 1965  MRN: [id number]    Date of Admission: 6/10/2020 10:48 PM  Date of Discharge:  2020  Primary Care Physician: Laverne Santa MD    Consults     No orders found for last 30 day(s).          Hospital Course     Presenting Problem:   DKA (diabetic ketoacidoses) (CMS/HCC) [E11.10]    Active Hospital Problems    Diagnosis  POA   • **DKA (diabetic ketoacidoses) (CMS/HCC) [E11.10]  Yes   • Acute constipation [K59.00]  Unknown   • GERD (gastroesophageal reflux disease) [K21.9]  Unknown   • Tobacco dependence [F17.200]  Yes   • Essential hypertension [I10]  Yes      Resolved Hospital Problems   No resolved problems to display.          Hospital Course:  Rosalia Langston is a 55 y.o. female with HTN, depression, recently diagnosed DM2. She was diagnosed prediabetic 2-3 years ago and reports has not followed up significantly. Has had ~4 weeks of polyuria and polydipsia. About 2 weeks ago developed persistent nausea without vomiting. The timeline is slightly unclear but reports being started on metformin just prior to admission, then was seen in the office the day of admission and had significantly elevated BG and received a \"shot of insulin\" in the office and sent home with januvia and told to stop metformin. On returning home she began vomiting profusely and called back in and was told to come to the ED. On arrival had elevated BG of 382 with some ketones, however she was not acidotic (pH was 7.4 and bicarb 23). She was started on insulin drip and her BG has improved. She was volume expanded and her N/V has improved drastically. Likely she has diabetes for a while and only recently diagnosed, could have an element of gastroparesis, and incidentally found to be hyperglycemic as she does not technically meet criteria for DKA. She is doing well with a diet currently, given proximity to the weekend I " will start her on 10U lantus to manage her BG until early next week, I have asked her to take metformin and hold the januvia for now. She is to check BG's at home and over time with titration of medications she hopes to come off of insulin. She reports she has zofran at home for nausea if needed.    DM type 2 with hyperglycemia and ketosis; uncontrolled  - a1c 9.9%  - s/p insulin drip  - labs not c/w DKA  - Rx 10U lantus and PO metformin 500mg at discharge, has appt with PCP telemed in 5 days - consider titration of meds including possible SGLT2's/GLP1's and uptitration of metformin in an effort to wean off of insulin - she has been seen by the DM educator here  - she is on an ACE  - will need DM eye exams/foot exams  - she has testing supplies at home    Subacute nausea/vomiting  New GERD symptoms  - mild gastroparesis?; though no neuropathy  - enteritis?  - severe volume depletion from glucosuria?  - tolerating diet, Rx pepcid at DC and monitor, consider further workup if Syx persist      Discharge Follow Up Recommendations for outpatient labs/diagnostics:   Has appt via telemed with PCP 6/16    Day of Discharge     HPI:   Feeling better, no more vomiting, eager to go home. Reluctant to start insulin but willing to try for a while while getting better glycemic control.    Review of Systems  Gen- No fevers, chills  CV- No chest pain, palpitations  Resp- No cough, dyspnea  GI- No N/V/D, abd pain    Vital Signs:   Temp:  [98 °F (36.7 °C)-98.4 °F (36.9 °C)] 98.4 °F (36.9 °C)  Heart Rate:  [58-80] 78  Resp:  [18] 18  BP: (107-150)/(57-85) 120/71     Physical Exam:  Constitutional: No acute distress, awake, alert  HENT: NCAT, mucous membranes moist  Respiratory: Clear to auscultation bilaterally, respiratory effort normal   Cardiovascular: RRR, no murmurs, rubs, or gallops, palpable radial pulses bilaterally  Gastrointestinal: Positive bowel sounds, soft, nontender, nondistended  Musculoskeletal: No bilateral ankle  edema  Psychiatric: Appropriate affect, cooperative    Pertinent  and/or Most Recent Results     Results from last 7 days   Lab Units 06/11/20  0800 06/11/20  0330 06/10/20  2342 06/10/20  2159   WBC 10*3/mm3  --  7.37  --  8.61   HEMOGLOBIN g/dL  --  13.0  --  15.5   HEMATOCRIT %  --  40.0  --  46.7*   PLATELETS 10*3/mm3  --  245  --  307   SODIUM mmol/L 136 136 131* 131*   POTASSIUM mmol/L 3.7 3.5 4.1 4.6   CHLORIDE mmol/L 105 100 92* 91*   CO2 mmol/L 22.0 25.0 23.0 23.0   BUN mg/dL 17 18 25* 25*   CREATININE mg/dL 0.76 0.77 1.00 0.94   GLUCOSE mg/dL 160* 130* 382* 402*   CALCIUM mg/dL 8.2* 8.6 10.1 10.5     Results from last 7 days   Lab Units 06/10/20  2159   BILIRUBIN mg/dL 0.4   ALK PHOS U/L 135*   ALT (SGPT) U/L 22   AST (SGOT) U/L 15           Invalid input(s): TG, LDLCALC, LDLREALC  Results from last 7 days   Lab Units 06/10/20  2159   HEMOGLOBIN A1C % 9.90*   TROPONIN T ng/mL <0.010       Brief Urine Lab Results  (Last result in the past 365 days)      Color   Clarity   Blood   Leuk Est   Nitrite   Protein   CREAT   Urine HCG        06/10/20 2354 Yellow Cloudy Trace Small (1+) Negative Negative               Microbiology Results Abnormal     None          Imaging Results (All)     Procedure Component Value Units Date/Time    XR Chest 2 View [[id number]] Collected:  06/11/20 0147     Updated:  06/11/20 0149    Narrative:       CHEST X-RAY, 6/11/2020      HISTORY:    55-year-old female in the ED undergoing assessment for diabetic ketoacidosis.      TECHNIQUE:  PA lateral upright chest series.    COMPARISON:  *  Chest x-ray, 10/20/2018.    FINDINGS:  Shallow lung expansion with crowding of central vascular markings. No convincing pulmonary infiltrate or pleural effusion. Heart size and pulmonary vascularity are within normal limits.      Impression:       No definite active disease. Shallow lung expansion.    Signer Name: Jewel Frye MD   Signed: 6/11/2020 1:47 AM   Workstation Name: [id number]     [facility]          Results for orders placed during the hospital encounter of 07/15/19   Duplex Venous Lower Extremity - Right CAR    Narrative · Normal right lower extremity venous duplex scan.          Results for orders placed during the hospital encounter of 07/15/19   Duplex Venous Lower Extremity - Right CAR    Narrative · Normal right lower extremity venous duplex scan.          Discharge Details        Discharge Medications      New Medications      Instructions Start Date   famotidine 10 MG tablet  Commonly known as:  Pepcid AC   20 mg, Oral, 2 Times Daily      Insulin Glargine 100 UNIT/ML injection pen  Commonly known as:  LANTUS SOLOSTAR   10 Units, Subcutaneous, Daily   Start Date:  June 12, 2020     Insulin Pen Needle 32G X 4 MM misc  Commonly known as:  BD Pen Needle Vandana U/F   1 each, Does not apply, 4 Times Daily PRN, E11.9      metFORMIN  MG 24 hr tablet  Commonly known as:  GLUCOPHAGE-XR   500 mg, Oral, Daily With Breakfast         Continue These Medications      Instructions Start Date   benazepril 40 MG tablet  Commonly known as:  LOTENSIN   Oral, Daily      PARoxetine 20 MG tablet  Commonly known as:  PAXIL   20 mg, Oral, Every Morning         Stop These Medications    chlorthalidone 25 MG tablet  Commonly known as:  HYGROTON     SITagliptin 100 MG tablet  Commonly known as:  JANUVIA            No Known Allergies      Discharge Disposition:  Home or Self Care    Diet:  Hospital:  Diet Order   Procedures   • Diet Regular; GI Soft, Consistent Carbohydrate         CODE STATUS:    Code Status and Medical Interventions:   Ordered at: 06/10/20 9747     Code Status:    CPR     Medical Interventions (Level of Support Prior to Arrest):    Full       No future appointments.    Additional Instructions for the Follow-ups that You Need to Schedule     Discharge Follow-up with PCP   As directed       Currently Documented PCP:    Laverne Santa MD    PCP Phone Number:     [phone]     Follow Up Details:  Has appt with PCP in 5 days               Time Spent on Discharge:  34 minutes    Electronically signed by Arsh Schneider DO, 06/11/20, 3:53 PM.

## 2020-06-11 NOTE — ED PROVIDER NOTES
"Subjective   55-year-old female sent by her primary care physician to be evaluated for possible DKA.  Of note, the patient has been a prediabetic for several years.  However, over the past week or so she states that she has been experiencing significant polyuria and polydipsia.  She has seen her primary care physician regarding her symptoms and has noted that her blood sugars at home have been significantly elevated over the past few days.  She has been on metformin for the past 2 days but has remained persistently hyperglycemic.  She followed up in the office today and was noted to have a fingerstick blood sugar of nearly 500.  She was subsequently sent to the emergency department for evaluation of \"possible DKA.\"  The patient denies any recent illness.  No fever.  No cough.  No sick contacts.  No exposures to anyone with a novel coronavirus.          Review of Systems   Constitutional: Negative for chills and fever.   Respiratory: Negative for cough and shortness of breath.    Cardiovascular: Negative for chest pain.   Endocrine: Positive for polydipsia and polyuria.   All other systems reviewed and are negative.      Past Medical History:   Diagnosis Date   • Diabetes mellitus (CMS/HCC)     HAD BEEN BORDERLINE PREVIOUSLY    • Essential hypertension    • Serrated polyp of colon 2015   • Situational depression after spouse's death        No Known Allergies    Past Surgical History:   Procedure Laterality Date   • ABDOMINAL HYSTERECTOMY      Done for ruptured uterus at the time of    • CHOLECYSTECTOMY OPEN     • EXPLORATORY LAPAROTOMY  2009    Exploratory laparotomy with enteral lysis and removal of large left-sided pelvic mass (hydrosalpinx).  Additionally TOT and ureteral stents placed       Family History   Problem Relation Age of Onset   • Ovarian cancer Paternal Aunt    • Colon cancer Paternal Uncle    • Diabetes Mother        Social History     Socioeconomic History   • " Marital status:      Spouse name: Not on file   • Number of children: Not on file   • Years of education: Not on file   • Highest education level: Not on file   Tobacco Use   • Smoking status: Current Every Day Smoker     Packs/day: 0.50     Types: Cigarettes     Start date: 1992   • Smokeless tobacco: Never Used   Substance and Sexual Activity   • Alcohol use: Yes     Comment: occasional   • Drug use: No   • Sexual activity: Defer           Objective   Physical Exam   Constitutional: She is oriented to person, place, and time. She appears well-developed and well-nourished. No distress.   Well-appearing female in no acute distress   HENT:   Head: Normocephalic and atraumatic.   Mouth/Throat: Oropharynx is clear and moist.   Cardiovascular: Normal rate, regular rhythm, normal heart sounds and intact distal pulses. Exam reveals no gallop and no friction rub.   No murmur heard.  Pulmonary/Chest: Effort normal and breath sounds normal. No respiratory distress. She has no wheezes. She has no rales.   Abdominal: Soft. Bowel sounds are normal. She exhibits no distension and no mass. There is no tenderness. There is no rebound and no guarding.   Musculoskeletal: Normal range of motion.   Neurological: She is alert and oriented to person, place, and time.   Skin: Skin is warm and dry. No rash noted. She is not diaphoretic. No erythema.   Psychiatric: She has a normal mood and affect. Judgment and thought content normal.   Nursing note and vitals reviewed.      Critical Care  Performed by: Morro Boyd MD  Authorized by: Morro Boyd MD     Critical care provider statement:     Critical care time (minutes):  32    Critical care was necessary to treat or prevent imminent or life-threatening deterioration of the following conditions:  Endocrine crisis    Critical care was time spent personally by me on the following activities:  Development of treatment plan with patient or surrogate, evaluation of  "patient's response to treatment, examination of patient, obtaining history from patient or surrogate, ordering and performing treatments and interventions, ordering and review of laboratory studies, ordering and review of radiographic studies, re-evaluation of patient's condition, review of old charts and pulse oximetry               ED Course  ED Course as of Oscar 10 2344   Wed Oscar 10, 2020   2343 55-year-old female sent by her primary care physician to be evaluated for \"possible DKA.\"  The patient has been a prediabetic for some time but has been experiencing significant polyuria and polydipsia over the past week.  She has remained persistently hyperglycemic and was noted to have a blood sugar of nearly 500 in the office this afternoon prompting her physician to send her here for evaluation of potential DKA.  On arrival, patient nontoxic-appearing.  Benign exam.  Labs consistent with significant hyperglycemia with mildly elevated anion gap.  Labs are consistent with mild DKA.  IV fluids administered.  Insulin drip initiated.  I discussed the patient's case with Dr. Delgadillo, and the patient will be admitted under her care for further evaluation and treatment.  She is aware/agreeable with the plan.    [DD]      ED Course User Index  [DD] Morro Boyd MD                                 Recent Results (from the past 24 hour(s))   POC Glucose Once    Collection Time: 06/10/20  9:47 PM   Result Value Ref Range    Glucose 371 (H) 70 - 130 mg/dL   Comprehensive Metabolic Panel    Collection Time: 06/10/20  9:59 PM   Result Value Ref Range    Glucose 402 (C) 65 - 99 mg/dL    BUN 25 (H) 6 - 20 mg/dL    Creatinine 0.94 0.57 - 1.00 mg/dL    Sodium 131 (L) 136 - 145 mmol/L    Potassium 4.6 3.5 - 5.2 mmol/L    Chloride 91 (L) 98 - 107 mmol/L    CO2 23.0 22.0 - 29.0 mmol/L    Calcium 10.5 8.6 - 10.5 mg/dL    Total Protein 7.5 6.0 - 8.5 g/dL    Albumin 4.60 3.50 - 5.20 g/dL    ALT (SGPT) 22 1 - 33 U/L    AST (SGOT) 15 1 - " 32 U/L    Alkaline Phosphatase 135 (H) 39 - 117 U/L    Total Bilirubin 0.4 0.2 - 1.2 mg/dL    eGFR  African Amer 75 >60 mL/min/1.73    Globulin 2.9 gm/dL    A/G Ratio 1.6 g/dL    BUN/Creatinine Ratio 26.6 (H) 7.0 - 25.0    Anion Gap 17.0 (H) 5.0 - 15.0 mmol/L   Light Blue Top    Collection Time: 06/10/20  9:59 PM   Result Value Ref Range    Extra Tube hold for add-on    Green Top (Gel)    Collection Time: 06/10/20  9:59 PM   Result Value Ref Range    Extra Tube Hold for add-ons.    Lavender Top    Collection Time: 06/10/20  9:59 PM   Result Value Ref Range    Extra Tube hold for add-on    Gold Top - SST    Collection Time: 06/10/20  9:59 PM   Result Value Ref Range    Extra Tube Hold for add-ons.    CBC Auto Differential    Collection Time: 06/10/20  9:59 PM   Result Value Ref Range    WBC 8.61 3.40 - 10.80 10*3/mm3    RBC 5.75 (H) 3.77 - 5.28 10*6/mm3    Hemoglobin 15.5 12.0 - 15.9 g/dL    Hematocrit 46.7 (H) 34.0 - 46.6 %    MCV 81.2 79.0 - 97.0 fL    MCH 27.0 26.6 - 33.0 pg    MCHC 33.2 31.5 - 35.7 g/dL    RDW 13.4 12.3 - 15.4 %    RDW-SD 39.2 37.0 - 54.0 fl    MPV 11.2 6.0 - 12.0 fL    Platelets 307 140 - 450 10*3/mm3    Neutrophil % 55.7 42.7 - 76.0 %    Lymphocyte % 34.4 19.6 - 45.3 %    Monocyte % 7.8 5.0 - 12.0 %    Eosinophil % 1.4 0.3 - 6.2 %    Basophil % 0.5 0.0 - 1.5 %    Immature Grans % 0.2 0.0 - 0.5 %    Neutrophils, Absolute 4.80 1.70 - 7.00 10*3/mm3    Lymphocytes, Absolute 2.96 0.70 - 3.10 10*3/mm3    Monocytes, Absolute 0.67 0.10 - 0.90 10*3/mm3    Eosinophils, Absolute 0.12 0.00 - 0.40 10*3/mm3    Basophils, Absolute 0.04 0.00 - 0.20 10*3/mm3    Immature Grans, Absolute 0.02 0.00 - 0.05 10*3/mm3    nRBC 0.0 0.0 - 0.2 /100 WBC   Beta Hydroxybutyrate Quantitative    Collection Time: 06/10/20  9:59 PM   Result Value Ref Range    Beta-Hydroxybutyrate Quant 0.999 (H) 0.020 - 0.270 mmol/L   Magnesium    Collection Time: 06/10/20  9:59 PM   Result Value Ref Range    Magnesium 2.1 1.6 - 2.6 mg/dL    Phosphorus    Collection Time: 06/10/20  9:59 PM   Result Value Ref Range    Phosphorus 3.3 2.5 - 4.5 mg/dL   Hemoglobin A1c    Collection Time: 06/10/20  9:59 PM   Result Value Ref Range    Hemoglobin A1C 9.90 (H) 4.80 - 5.60 %   Troponin    Collection Time: 06/10/20  9:59 PM   Result Value Ref Range    Troponin T <0.010 0.000 - 0.030 ng/mL   POC Glucose Once    Collection Time: 06/10/20 11:40 PM   Result Value Ref Range    Glucose 348 (H) 70 - 130 mg/dL   Osmolality, Serum    Collection Time: 06/10/20 11:42 PM   Result Value Ref Range    Osmolality 303 (H) 275 - 295 mOsm/kg   Basic Metabolic Panel    Collection Time: 06/10/20 11:42 PM   Result Value Ref Range    Glucose 382 (H) 65 - 99 mg/dL    BUN 25 (H) 6 - 20 mg/dL    Creatinine 1.00 0.57 - 1.00 mg/dL    Sodium 131 (L) 136 - 145 mmol/L    Potassium 4.1 3.5 - 5.2 mmol/L    Chloride 92 (L) 98 - 107 mmol/L    CO2 23.0 22.0 - 29.0 mmol/L    Calcium 10.1 8.6 - 10.5 mg/dL    eGFR  African Amer 70 >60 mL/min/1.73    BUN/Creatinine Ratio 25.0 7.0 - 25.0    Anion Gap 16.0 (H) 5.0 - 15.0 mmol/L   Phosphorus    Collection Time: 06/10/20 11:42 PM   Result Value Ref Range    Phosphorus 3.0 2.5 - 4.5 mg/dL   Urinalysis With Microscopic If Indicated (No Culture) - Urine, Clean Catch    Collection Time: 06/10/20 11:54 PM   Result Value Ref Range    Color, UA Yellow Yellow, Straw    Appearance, UA Cloudy (A) Clear    pH, UA <=5.0 5.0 - 8.0    Specific Gravity, UA 1.028 1.001 - 1.030    Glucose, UA >=1000 mg/dL (3+) (A) Negative    Ketones, UA 15 mg/dL (1+) (A) Negative    Bilirubin, UA Negative Negative    Blood, UA Trace (A) Negative    Protein, UA Negative Negative    Leuk Esterase, UA Small (1+) (A) Negative    Nitrite, UA Negative Negative    Urobilinogen, UA 0.2 E.U./dL 0.2 - 1.0 E.U./dL   Urinalysis, Microscopic Only - Urine, Clean Catch    Collection Time: 06/10/20 11:54 PM   Result Value Ref Range    RBC, UA 0-2 None Seen, 0-2 /HPF    WBC, UA 21-30 (A) None Seen, 0-2  /HPF    Bacteria, UA Trace None Seen, Trace /HPF    Squamous Epithelial Cells, UA 3-6 (A) None Seen, 0-2 /HPF    Yeast, UA Small/1+ Budding Yeast None Seen /HPF    Hyaline Casts, UA 0-6 0 - 6 /LPF    Methodology Manual Light Microscopy    POC Glucose Once    Collection Time: 06/11/20 12:43 AM   Result Value Ref Range    Glucose 265 (H) 70 - 130 mg/dL   POC Glucose Once    Collection Time: 06/11/20  1:55 AM   Result Value Ref Range    Glucose 131 (H) 70 - 130 mg/dL   POC Glucose Once    Collection Time: 06/11/20  2:55 AM   Result Value Ref Range    Glucose 93 70 - 130 mg/dL   POC Glucose Once    Collection Time: 06/11/20  3:55 AM   Result Value Ref Range    Glucose 133 (H) 70 - 130 mg/dL     Note: In addition to lab results from this visit, the labs listed above may include labs taken at another facility or during a different encounter within the last 24 hours. Please correlate lab times with ED admission and discharge times for further clarification of the services performed during this visit.    XR Chest 2 View   Final Result   No definite active disease. Shallow lung expansion.      Signer Name: Jewel Frye MD    Signed: 6/11/2020 1:47 AM    Workstation Name: [email or web address]     [facility]        Vitals:    06/11/20 0142 06/11/20 0144 06/11/20 0152 06/11/20 0300   BP: 131/73 132/73  107/57   BP Location: Left arm Right leg  Left arm   Patient Position:    Lying   Pulse:   64 59   Resp:    18   Temp:    98 °F (36.7 °C)   TempSrc:    Oral   SpO2:  99% 99% 96%   Weight:       Height:         Medications   sodium chloride 0.9 % flush 10 mL (has no administration in time range)   insulin regular 1 unit/mL in 0.9% sodium chloride (2 Units/hr Intravenous Rate Change (DUAL SIGN) 6/11/20 0358)   sodium chloride 0.9 % flush 10 mL (has no administration in time range)   sodium chloride 0.9 % infusion (has no administration in time range)   lisinopril (PRINIVIL,ZESTRIL) tablet 40 mg (has no  administration in time range)   PARoxetine (PAXIL) tablet 20 mg (has no administration in time range)   sodium chloride 0.9 % flush 3 mL (3 mL Intravenous Not Given 6/11/20 0209)   sodium chloride 0.9 % flush 10 mL (has no administration in time range)   sodium chloride 0.9 % flush 10 mL (10 mL Intravenous Not Given 6/11/20 0209)   sodium chloride 0.9 % flush 10 mL (has no administration in time range)   sodium chloride 0.9 % infusion (has no administration in time range)   sodium chloride 0.9 % with KCl 20 mEq/L infusion (has no administration in time range)   sodium chloride 0.9 % with KCl 40 mEq/L infusion (has no administration in time range)   dextrose 5 % and sodium chloride 0.9 % infusion (has no administration in time range)   dextrose 5 % and sodium chloride 0.9 % with KCl 40 mEq/L infusion (has no administration in time range)   dextrose 5 % and sodium chloride 0.9 % with KCl 20 mEq/L infusion (150 mL/hr Intravenous Currently Infusing 6/11/20 0409)   sodium chloride 0.45 % infusion (has no administration in time range)   sodium chloride 0.45 % with KCl 20 mEq/L infusion (has no administration in time range)   sodium chloride 0.45 % 1,000 mL with potassium chloride 40 mEq infusion (has no administration in time range)   dextrose 5 % and sodium chloride 0.45 % infusion (has no administration in time range)   dextrose 5 % and sodium chloride 0.45 % with KCl 20 mEq/L infusion (has no administration in time range)   dextrose 5 % and sodium chloride 0.45 % with KCl 40 mEq/L infusion (has no administration in time range)   dextrose (D50W) 25 g/ 50mL Intravenous Solution 12.5 g (12.5 g Intravenous Given 6/11/20 0300)   sodium chloride 0.9 % flush 10 mL (has no administration in time range)   sodium chloride 0.9 % infusion (has no administration in time range)   heparin (porcine) 5000 UNIT/ML injection 5,000 Units (has no administration in time range)   acetaminophen (TYLENOL) tablet 650 mg (has no administration  in time range)     Or   acetaminophen (TYLENOL) 160 MG/5ML solution 650 mg (has no administration in time range)     Or   acetaminophen (TYLENOL) suppository 650 mg (has no administration in time range)   melatonin tablet 5 mg (has no administration in time range)   ondansetron (ZOFRAN) tablet 4 mg (has no administration in time range)     Or   ondansetron (ZOFRAN) injection 4 mg (has no administration in time range)   sennosides-docusate (PERICOLACE) 8.6-50 MG per tablet 2 tablet (has no administration in time range)   famotidine (PEPCID) injection 20 mg (has no administration in time range)   nicotine (NICODERM CQ) 7 MG/24HR patch 1 patch (has no administration in time range)   sodium chloride 0.9 % bolus 1,000 mL (0 mL Intravenous Stopped 6/11/20 0133)   sodium chloride 0.9 % bolus 1,000 mL (0 mL Intravenous Stopped 6/11/20 0133)     ECG/EMG Results (last 24 hours)     Procedure Component Value Units Date/Time    ECG 12 Lead [[id number]] Collected:  06/11/20 0216     Updated:  06/11/20 0216    Narrative:       Test Reason : DKA  Blood Pressure : **/** mmHG  Vent. Rate : 060 BPM     Atrial Rate : 060 BPM     P-R Int : 170 ms          QRS Dur : 086 ms      QT Int : 428 ms       P-R-T Axes : 016 -14 024 degrees     QTc Int : 428 ms    Normal sinus rhythm  Minimal voltage criteria for LVH, may be normal variant  Borderline ECG  When compared with ECG of 20-OCT-2018 06:56,  No significant change was found    Referred By:             Confirmed By:         ECG 12 Lead   Preliminary Result   Test Reason : DKA   Blood Pressure : **/** mmHG   Vent. Rate : 060 BPM     Atrial Rate : 060 BPM      P-R Int : 170 ms          QRS Dur : 086 ms       QT Int : 428 ms       P-R-T Axes : 016 -14 024 degrees      QTc Int : 428 ms      Normal sinus rhythm   Minimal voltage criteria for LVH, may be normal variant   Borderline ECG   When compared with ECG of 20-OCT-2018 06:56,   No significant change was found      Referred By:              Confirmed By:                     MDM    Final diagnoses:   Diabetic ketoacidosis without coma associated with type 2 diabetes mellitus (CMS/HCC)   Hyperglycemia            Morro Boyd MD  06/11/20 7294

## 2020-06-11 NOTE — CONSULTS
"Diabetes Education    Patient Name:  Rosalia Langston  YOB: 1965  MRN: [id number]  Admit Date:  6/10/2020        Saw Ms. Langston at bedside for diabetes education. Pt gave permission for visit,  at bedside. Pt is new diagnosis of diabetes \"a month or so ago\" and was placed on metformin; she is unsure what A1c was at that time, states she was \"prediabetes\" for many years. States for about 1 week had symptoms of increased thirst, increased urination, very fatigued, nausea. She was not monitoring blood sugars at home. Her PCP did prescribe a glucometer for home use yesterday, her  has picked it up and brought to hospital today. Meter is freestyle lite, we opened in room, educated pt on care and use of her meter, strips, lancing device, and lancets; educated on how to load lancing device and home disposal of sharps. She returned demonstration with all and performed fingerstick in room with her home meter. [facility] insurance covered meter and supplies.   Discussed and taught Ms. Langston about type 2 diabetes self-management, risk factors, and importance of blood glucose control to reduce complications. Discussed basic pathophysiology of diabetes and how related to increased risk for complications. Pt asked about smoking-states she is a smoker-discussed effects smoking has on blood vessels and how combined with diabetes and HTN greatly increases risk for complications. Encouraged her to quit smoking. Target blood glucose readings and A1c goals per ADA were reviewed. Reviewed with patient current A1c 9.9 and discussed its significance. Signs, symptoms, and treatment of hyperglycemia and hypoglycemia were discussed. Lifestyle changes such as physical activity with MD approval and healthy eating were encouraged. Discussed basic of consistent carb diet. Pt states she had been drinking Ann green tea in bottles-not diet-25 gm carbs/serving all from sugar content. Encouraged water and non sugary drinks. " "Also discussed DKA w/ pt and provided handout from [facility]GEL[facility] on DKA and went over s/sx and importance of seeking treatment if she experiences any of these s/sx.   During visit, primary RN states pt will be going home on insulin. Primary RN brought in pt's levemir-instructed pt on how to give injection and pt gave levemir to herself in her abdomen. Then provided further insulin teaching.  Discussed and taught patient about current hospital insulins including the onset, peak, and duration of the insulins. Taught patient the correct sites for insulin injections and the importance of rotating insulin injection site. Taught patient the correct storage for opened insulin at room temperature, storage for unopened insulin in the refrigerator, and expiration dates for insulin. Demonstrated and taught patient how to use demo pen with appropriate injection technique on the demo pillow. Patient was able to return demonstration without difficulty. Also demonstrated and taught patient how to use a demo insulin syringe with a vial of normal saline. Patient was able to return demonstration without difficulty using the correct injection technique into a demo pillow.   Pt states she has been instructed by provider to check blood sugar fasting in the morning, before lunch, and after lunch. Reinforced. Stressed importance of keeping record of blood glucose readings from home to take to every follow up appointment with PCP. Pt asked about seeing an endocrinologist, discussed options. Stressed importance of ongoing follow up.   Provided patient with copy of [facility]'s \"What is Diabetes\" handout, \"Blood Glucose Goals\" handout, and \"What is A1c\" handout. Pt would greatly benefit from outpatient diabetes education. She agreed I could contact her post discharge to follow up. Will contact in 1-2 weeks; provided pt with our contact information for any questions or needs. Thank you for the consult.       Electronically signed by:  Anne Marie " ESTHELA Abbott  06/11/20 16:03

## 2020-06-11 NOTE — PLAN OF CARE
Pt transferred to floor around 0130. Insulin drip maintained. Q1h blood glucose between [id number]. Protocol followed as far as titrating drip and different fluid replacement. Drip is currently infusing at 2u/hr, Dextrose 5% in 1/2 NS with 20 Amita/l KCl @ 150/h. All of this handed off in report. Day nurse will cont to monitor. Uneventful night shift.

## 2020-06-11 NOTE — PLAN OF CARE
At approx 0200 pt . Na 131, K 4.1. Per protocol Dextrose in NS with KCl 20 mEq/L initiated at 150 ml/h. Will continue to monitor

## 2020-06-11 NOTE — PROGRESS NOTES
"Adult Nutrition  Assessment/PES    Patient Name:  Rosalia Langston  YOB: 1965  MRN: [id number]  Admit Date:  6/10/2020    Assessment Date:  6/11/2020    Comments:      Reason for Assessment     Row Name 06/11/20 1410          Reason for Assessment    Reason For Assessment  other (see comments) EDUCATION     Diagnosis  -- DKA, CONSTIPATION. PMH: HTN, TOB GERD, DEPRESSION, PRE-DIABETES         Nutrition/Diet History     Row Name 06/11/20 1412          Nutrition/Diet History    Typical Food/Fluid Intake  PT VERBALIZES UNDERSTANDING OF DIET REVIEWED.               Anthropometrics     Row Name 06/11/20 1413          Anthropometrics    Height  160 cm (63\")        Admit Weight    Admit Weight  85.4 kg (188 lb 4.8 oz)        Ideal Body Weight (IBW)    Ideal Body Weight (IBW) (kg)  52.72         Labs/Tests/Procedures/Meds     Row Name 06/11/20 1414          Labs/Procedures/Meds    Lab Results Reviewed  reviewed     Lab Results Comments  A1C        Medications    Pertinent Medications Reviewed  reviewed           Estimated/Assessed Needs     Row Name 06/11/20 1413          Calculation Measurements    Height  160 cm (63\")                   Problem/Interventions:  Problem 1     Row Name 06/11/20 1415          Nutrition Diagnoses Problem 1    Problem 1  Knowledge Deficit     Etiology (related to)  MNT for Treatment/Condition     Signs/Symptoms (evidenced by)  Potential Information Deficit               Intervention Goal     Row Name 06/11/20 1416          Intervention Goal    General  Provide information regarding MNT for treatment/condition             Education/Evaluation     Row Name 06/11/20 1417          Education    Education  Provided education regarding     Provided education regarding  Diet rationale;Healthy eating for diabetes;Key food habit change;Other (comment) 4 CHO/MEAL PLAN           Electronically signed by:  Kalie Ponce RD  06/11/20 14:18  "

## 2020-06-15 ENCOUNTER — NURSE TRIAGE (OUTPATIENT)
Dept: CALL CENTER | Facility: HOSPITAL | Age: 55
End: 2020-06-15

## 2021-11-13 ENCOUNTER — HOSPITAL ENCOUNTER (EMERGENCY)
Facility: HOSPITAL | Age: 56
Discharge: HOME | End: 2021-11-13
Attending: EMERGENCY MEDICINE | Admitting: EMERGENCY MEDICINE

## 2021-11-13 VITALS
WEIGHT: 182 LBS | DIASTOLIC BLOOD PRESSURE: 6 MMHG | RESPIRATION RATE: 16 BRPM | BODY MASS INDEX: 32.25 KG/M2 | TEMPERATURE: 97.7 F | HEIGHT: 63 IN | SYSTOLIC BLOOD PRESSURE: 124 MMHG | HEART RATE: 55 BPM | OXYGEN SATURATION: 100 %

## 2021-11-13 DIAGNOSIS — E11.65 TYPE 2 DIABETES MELLITUS WITH HYPERGLYCEMIA, WITHOUT LONG-TERM CURRENT USE OF INSULIN (HCC): Primary | ICD-10-CM

## 2021-11-13 DIAGNOSIS — R03.0 ELEVATED BLOOD PRESSURE READING: ICD-10-CM

## 2021-11-13 LAB
ALBUMIN SERPL-MCNC: 4.2 G/DL (ref 3.5–5.2)
ALBUMIN/GLOB SERPL: 1.4 G/DL
ALP SERPL-CCNC: 99 U/L (ref 39–117)
ALT SERPL W P-5'-P-CCNC: 24 U/L (ref 1–33)
ANION GAP SERPL CALCULATED.3IONS-SCNC: 11 MMOL/L (ref 5–15)
AST SERPL-CCNC: 19 U/L (ref 1–32)
BACTERIA UR QL AUTO: ABNORMAL /HPF
BASOPHILS # BLD AUTO: 0.02 10*3/MM3 (ref 0–0.2)
BASOPHILS NFR BLD AUTO: 0.3 % (ref 0–1.5)
BILIRUB SERPL-MCNC: 0.5 MG/DL (ref 0–1.2)
BILIRUB UR QL STRIP: NEGATIVE
BUN SERPL-MCNC: 11 MG/DL (ref 6–20)
BUN/CREAT SERPL: 13.3 (ref 7–25)
CALCIUM SPEC-SCNC: 10 MG/DL (ref 8.6–10.5)
CHLORIDE SERPL-SCNC: 105 MMOL/L (ref 98–107)
CLARITY UR: ABNORMAL
CO2 SERPL-SCNC: 27 MMOL/L (ref 22–29)
COLOR UR: YELLOW
CREAT SERPL-MCNC: 0.83 MG/DL (ref 0.57–1)
DEPRECATED RDW RBC AUTO: 45.7 FL (ref 37–54)
EOSINOPHIL # BLD AUTO: 0.16 10*3/MM3 (ref 0–0.4)
EOSINOPHIL NFR BLD AUTO: 2.5 % (ref 0.3–6.2)
ERYTHROCYTE [DISTWIDTH] IN BLOOD BY AUTOMATED COUNT: 15 % (ref 12.3–15.4)
GFR SERPL CREATININE-BSD FRML MDRD: 86 ML/MIN/1.73
GLOBULIN UR ELPH-MCNC: 3.1 GM/DL
GLUCOSE BLDC GLUCOMTR-MCNC: 133 MG/DL (ref 70–130)
GLUCOSE SERPL-MCNC: 145 MG/DL (ref 65–99)
GLUCOSE UR STRIP-MCNC: NEGATIVE MG/DL
HBA1C MFR BLD: 6.8 % (ref 4.8–5.6)
HCT VFR BLD AUTO: 44.3 % (ref 34–46.6)
HGB BLD-MCNC: 14.5 G/DL (ref 12–15.9)
HGB UR QL STRIP.AUTO: ABNORMAL
HYALINE CASTS UR QL AUTO: ABNORMAL /LPF
IMM GRANULOCYTES # BLD AUTO: 0.02 10*3/MM3 (ref 0–0.05)
IMM GRANULOCYTES NFR BLD AUTO: 0.3 % (ref 0–0.5)
KETONES UR QL STRIP: NEGATIVE
LEUKOCYTE ESTERASE UR QL STRIP.AUTO: ABNORMAL
LIPASE SERPL-CCNC: 36 U/L (ref 13–60)
LYMPHOCYTES # BLD AUTO: 1.9 10*3/MM3 (ref 0.7–3.1)
LYMPHOCYTES NFR BLD AUTO: 30.3 % (ref 19.6–45.3)
MCH RBC QN AUTO: 27.2 PG (ref 26.6–33)
MCHC RBC AUTO-ENTMCNC: 32.7 G/DL (ref 31.5–35.7)
MCV RBC AUTO: 83.1 FL (ref 79–97)
MONOCYTES # BLD AUTO: 0.42 10*3/MM3 (ref 0.1–0.9)
MONOCYTES NFR BLD AUTO: 6.7 % (ref 5–12)
NEUTROPHILS NFR BLD AUTO: 3.76 10*3/MM3 (ref 1.7–7)
NEUTROPHILS NFR BLD AUTO: 59.9 % (ref 42.7–76)
NITRITE UR QL STRIP: NEGATIVE
NRBC BLD AUTO-RTO: 0 /100 WBC (ref 0–0.2)
PH UR STRIP.AUTO: 6 [PH] (ref 5–8)
PLATELET # BLD AUTO: 311 10*3/MM3 (ref 140–450)
PMV BLD AUTO: 10 FL (ref 6–12)
POTASSIUM SERPL-SCNC: 3.6 MMOL/L (ref 3.5–5.2)
PROT SERPL-MCNC: 7.3 G/DL (ref 6–8.5)
PROT UR QL STRIP: NEGATIVE
RBC # BLD AUTO: 5.33 10*6/MM3 (ref 3.77–5.28)
RBC # UR: ABNORMAL /HPF
REF LAB TEST METHOD: ABNORMAL
SODIUM SERPL-SCNC: 143 MMOL/L (ref 136–145)
SP GR UR STRIP: 1.01 (ref 1–1.03)
SQUAMOUS #/AREA URNS HPF: ABNORMAL /HPF
UROBILINOGEN UR QL STRIP: ABNORMAL
WBC # BLD AUTO: 6.28 10*3/MM3 (ref 3.4–10.8)
WBC UR QL AUTO: ABNORMAL /HPF

## 2021-11-13 PROCEDURE — 81001 URINALYSIS AUTO W/SCOPE: CPT | Performed by: EMERGENCY MEDICINE

## 2021-11-13 PROCEDURE — 85025 COMPLETE CBC W/AUTO DIFF WBC: CPT | Performed by: EMERGENCY MEDICINE

## 2021-11-13 PROCEDURE — 80053 COMPREHEN METABOLIC PANEL: CPT | Performed by: EMERGENCY MEDICINE

## 2021-11-13 PROCEDURE — 99283 EMERGENCY DEPT VISIT LOW MDM: CPT

## 2021-11-13 PROCEDURE — 83036 HEMOGLOBIN GLYCOSYLATED A1C: CPT | Performed by: EMERGENCY MEDICINE

## 2021-11-13 PROCEDURE — 82962 GLUCOSE BLOOD TEST: CPT

## 2021-11-13 PROCEDURE — 83690 ASSAY OF LIPASE: CPT | Performed by: EMERGENCY MEDICINE

## 2021-11-13 RX ORDER — CHLORTHALIDONE 25 MG/1
25 TABLET ORAL DAILY
COMMUNITY
Start: 2021-08-08

## 2021-11-13 RX ORDER — SODIUM CHLORIDE 0.9 % (FLUSH) 0.9 %
10 SYRINGE (ML) INJECTION AS NEEDED
Status: DISCONTINUED | OUTPATIENT
Start: 2021-11-13 | End: 2021-11-13 | Stop reason: HOSPADM

## 2021-11-13 RX ADMIN — SODIUM CHLORIDE 1000 ML: 9 INJECTION, SOLUTION INTRAVENOUS at 09:19

## 2021-11-13 NOTE — ED PROVIDER NOTES
"Subjective   The patient presents to the emergency department secondary to \"feeling sluggish\" for about the last week.  Patient reports her glucose levels have been running in the 200s.  Patient does report a history of type 2 diabetes mellitus which she was on Metformin in the past but was taken off of it secondary to low blood sugars.  Patient reports she has not been compliant with the recommendations of diet and exercise.  Patient denies any fever or chills.  No chest pain or shortness of breath.  No nausea, vomiting, or diarrhea.  Patient denies any pain with urination.  No other acute symptoms or complaints reported.      History provided by:  Patient      Review of Systems   Constitutional: Positive for fatigue.   Respiratory: Negative.    Cardiovascular: Negative.    Gastrointestinal: Negative.    Musculoskeletal: Negative.    Skin: Negative.    Neurological: Positive for weakness.   Psychiatric/Behavioral: Negative.    All other systems reviewed and are negative.      Past Medical History:   Diagnosis Date   • Diabetes mellitus (HCC)     HAD BEEN BORDERLINE PREVIOUSLY    • Essential hypertension    • Serrated polyp of colon 2015       No Known Allergies    Past Surgical History:   Procedure Laterality Date   • ABDOMINAL HYSTERECTOMY      Done for ruptured uterus at the time of    • ABDOMINAL WALL MESH  REMOVAL      at [facility] r/t previuos uretheral sling   • BLADDER SUSPENSION     • CHOLECYSTECTOMY OPEN     • EXPLORATORY LAPAROTOMY  2009    Exploratory laparotomy with enteral lysis and removal of large left-sided pelvic mass (hydrosalpinx).  Additionally TOT and ureteral stents placed       Family History   Problem Relation Age of Onset   • Ovarian cancer Paternal Aunt    • Colon cancer Paternal Uncle    • Diabetes Mother        Social History     Socioeconomic History   • Marital status:    Tobacco Use   • Smoking status: Current Every Day Smoker     " Packs/day: 0.50     Types: Cigarettes     Start date: 1992   • Smokeless tobacco: Never Used   Substance and Sexual Activity   • Alcohol use: Yes     Comment: occasional   • Drug use: No   • Sexual activity: Defer           Objective   Physical Exam  Vitals and nursing note reviewed.   Constitutional:       General: She is not in acute distress.     Appearance: Normal appearance. She is obese. She is not ill-appearing.   Cardiovascular:      Rate and Rhythm: Normal rate and regular rhythm.      Pulses: Normal pulses.   Pulmonary:      Effort: Pulmonary effort is normal. No respiratory distress.      Breath sounds: Normal breath sounds.   Abdominal:      Palpations: Abdomen is soft.      Tenderness: There is no abdominal tenderness. There is no guarding.   Musculoskeletal:         General: Normal range of motion.      Cervical back: Normal range of motion.   Skin:     General: Skin is warm and dry.   Neurological:      General: No focal deficit present.      Mental Status: She is alert and oriented to person, place, and time.   Psychiatric:         Mood and Affect: Mood normal.         Behavior: Behavior normal.         Procedures           ED Course  ED Course as of 11/13/21 1545   Sat Nov 13, 2021   0928 Glucose(!): 133 [RS]   1027 Hemoglobin A1C(!): 6.80 [RS]   1033 The patient is resting comfortably.  No emergent findings on our evaluation here in the ER.  Patient is borderline on diabetes with hemoglobin A 1C of 6.8.  At this point, I believe patient is best treated with adherence to a diabetic diet as well as exercise.  She is to follow-up closely with her primary care physician. I had a discussion with the patient/family regarding diagnosis, diagnostic results, treatment plan, and medications.  The patient/family indicated understanding of these instructions.  I spent adequate time at the bedside proceeding discharge necessary to personally discuss the aftercare instructions, giving patient education,  providing explanations of the results of our evaluations/findings, and my decision making to assure that the patient/family understand the plan of care.  Time was allotted to answer questions at that time and throughout the ED course.  Emphasis was placed on timely follow-up after discharge.  I also discussed the potential for the development of an acute emergent condition requiring further evaluation, admission, or even surgical intervention. I discussed that we found nothing during the visit today indicating the need for further workup, admission, or the presence of an unstable medical condition.  I encouraged the patient to return to the emergency department immediately for ANY concerns, worsening, new complaints, or if symptoms persist and unable to seek follow-up in a timely fashion.  The patient/family expressed understanding and agreement with this plan.  [RS]      ED Course User Index  [RS] Lucius Talley MD                                           MDM  Number of Diagnoses or Management Options  Elevated blood pressure reading  Type 2 diabetes mellitus with hyperglycemia, without long-term current use of insulin (HCC)  Diagnosis management comments: Recent Results (from the past 24 hour(s))  -Comprehensive Metabolic Panel:   Collection Time: 11/13/21  9:16 AM  Specimen: Blood       Result                      Value             Ref Range           Glucose                     145 (H)           65 - 99 mg/dL       BUN                         11                6 - 20 mg/dL        Creatinine                  0.83              0.57 - 1.00 *       Sodium                      143               136 - 145 mm*       Potassium                   3.6               3.5 - 5.2 mm*       Chloride                    105               98 - 107 mmo*       CO2                         27.0              22.0 - 29.0 *       Calcium                     10.0              8.6 - 10.5 m*       Total Protein               7.3                6.0 - 8.5 g/*       Albumin                     4.20              3.50 - 5.20 *       ALT (SGPT)                  24                1 - 33 U/L          AST (SGOT)                  19                1 - 32 U/L          Alkaline Phosphatase        99                39 - 117 U/L        Total Bilirubin             0.5               0.0 - 1.2 mg*       eGFR   Amer          86                >60 mL/min/1*       Globulin                    3.1               gm/dL               A/G Ratio                   1.4               g/dL                BUN/Creatinine Ratio        13.3              7.0 - 25.0          Anion Gap                   11.0              5.0 - 15.0 m*  -Lipase:   Collection Time: 11/13/21  9:16 AM  Specimen: Blood       Result                      Value             Ref Range           Lipase                      36                13 - 60 U/L    -Hemoglobin A1c:   Collection Time: 11/13/21  9:16 AM  Specimen: Blood       Result                      Value             Ref Range           Hemoglobin A1C              6.80 (H)          4.80 - 5.60 %  -CBC Auto Differential:   Collection Time: 11/13/21  9:16 AM  Specimen: Blood       Result                      Value             Ref Range           WBC                         6.28              3.40 - 10.80*       RBC                         5.33 (H)          3.77 - 5.28 *       Hemoglobin                  14.5              12.0 - 15.9 *       Hematocrit                  44.3              34.0 - 46.6 %       MCV                         83.1              79.0 - 97.0 *       MCH                         27.2              26.6 - 33.0 *       MCHC                        32.7              31.5 - 35.7 *       RDW                         15.0              12.3 - 15.4 %       RDW-SD                      45.7              37.0 - 54.0 *       MPV                         10.0              6.0 - 12.0 fL       Platelets                   311               140 - 450  10*       Neutrophil %                59.9              42.7 - 76.0 %       Lymphocyte %                30.3              19.6 - 45.3 %       Monocyte %                  6.7               5.0 - 12.0 %        Eosinophil %                2.5               0.3 - 6.2 %         Basophil %                  0.3               0.0 - 1.5 %         Immature Grans %            0.3               0.0 - 0.5 %         Neutrophils, Absolute       3.76              1.70 - 7.00 *       Lymphocytes, Absolute       1.90              0.70 - 3.10 *       Monocytes, Absolute         0.42              0.10 - 0.90 *       Eosinophils, Absolute       0.16              0.00 - 0.40 *       Basophils, Absolute         0.02              0.00 - 0.20 *       Immature Grans, Absolu*     0.02              0.00 - 0.05 *       nRBC                        0.0               0.0 - 0.2 /1*  -Urinalysis With Microscopic If Indicated (No Culture) - Urine, Clean Catch:   Collection Time: 11/13/21  9:19 AM  Specimen: Urine, Clean Catch       Result                      Value             Ref Range           Color, UA                   Yellow            Yellow, Straw       Appearance, UA              Cloudy (A)        Clear               pH, UA                      6.0               5.0 - 8.0           Specific [place], UA        1.015             1.001 - 1.030       Glucose, UA                 Negative          Negative            Ketones, UA                 Negative          Negative            Bilirubin, UA               Negative          Negative            Blood, UA                   Trace (A)         Negative            Protein, UA                 Negative          Negative            Leuk Esterase, UA                             Negative        Small (1+) (A)       Nitrite, UA                 Negative          Negative            Urobilinogen, UA            0.2 E.U./dL       0.2 - 1.0 E.*  -Urinalysis, Microscopic Only - Urine, Clean Catch:   Collection  "Time: 11/13/21  9:19 AM  Specimen: Urine, Clean Catch       Result                      Value             Ref Range           RBC, UA                     3-6 (A)           None Seen, 0*       WBC, UA                     6-12 (A)          None Seen, 0*       Bacteria, UA                2+ (A)            None Seen, T*       Squamous Epithelial Ce*     7-12 (A)          None Seen, 0*       Hyaline Casts, UA           0-6               0 - 6 /LPF          Methodology                                                   Automated Microscopy  -POC Glucose Once:   Collection Time: 11/13/21  9:25 AM  Specimen: Blood       Result                      Value             Ref Range           Glucose                     133 (H)           70 - 130 mg/*  Note: In addition to lab results from this visit, the labs listed above may include labs taken at another facility or during a different encounter within the last 24 hours. Please correlate lab times with ED admission and discharge times for further clarification of the services performed during this visit.    No orders to display  -------------------------------------------------               11/13/21 11/13/21 11/13/21                  0846          0900      1020     -------------------------------------------------   BP:          131/74        128/64  (!) 124/6    BP Location:    Left arm                Left arm    Patient Position:     Sitting                 Lying      Pulse:         76                      55       Resp:          16                      16       Temp:   97.7 °F (36.5 °C)                       TempSrc:      Oral                              SpO2:          99%          99%       100%      Weight: 82.6 kg (182 lb)                        Height:   160 cm (63\")                         -------------------------------------------------  Medications  sodium chloride 0.9 % bolus 1,000 mL (0 mL " Intravenous Stopped 11/13/21 1025)  ECG/EMG Results (last 24 hours)     ** No results found for the last 24 hours. **      No orders to display         Amount and/or Complexity of Data Reviewed  Clinical lab tests: reviewed        Final diagnoses:   Type 2 diabetes mellitus with hyperglycemia, without long-term current use of insulin (HCC)   Elevated blood pressure reading       ED Disposition  ED Disposition     ED Disposition Condition Comment    Discharge Stable           Laverne Santa MD  [address] [facility] [address]  [place] [address]  [phone]    Schedule an appointment as soon as possible for a visit       [facility]  [address] [id number] [phone]    As needed, If symptoms worsen or ANY concerns.         Medication List      Stop    famotidine 10 MG tablet  Commonly known as: Pepcid AC     Insulin Glargine 100 UNIT/ML injection pen  Commonly known as: LANTUS SOLOSTAR     Insulin Pen Needle 32G X 4 MM misc  Commonly known as: BD Pen Needle Vandana U/F     metFORMIN  MG 24 hr tablet  Commonly known as: GLUCOPHAGE-XR             Lucius Talley MD  11/13/21 [id number]

## 2021-12-31 ENCOUNTER — APPOINTMENT (OUTPATIENT)
Dept: CT IMAGING | Facility: HOSPITAL | Age: 56
End: 2021-12-31

## 2021-12-31 ENCOUNTER — APPOINTMENT (OUTPATIENT)
Dept: GENERAL RADIOLOGY | Facility: HOSPITAL | Age: 56
End: 2021-12-31

## 2021-12-31 ENCOUNTER — HOSPITAL ENCOUNTER (EMERGENCY)
Facility: HOSPITAL | Age: 56
Discharge: HOME | End: 2021-12-31
Attending: EMERGENCY MEDICINE | Admitting: EMERGENCY MEDICINE

## 2021-12-31 VITALS
HEART RATE: 60 BPM | WEIGHT: 180 LBS | BODY MASS INDEX: 29.99 KG/M2 | SYSTOLIC BLOOD PRESSURE: 138 MMHG | OXYGEN SATURATION: 99 % | HEIGHT: 65 IN | DIASTOLIC BLOOD PRESSURE: 71 MMHG | RESPIRATION RATE: 20 BRPM | TEMPERATURE: 98.4 F

## 2021-12-31 DIAGNOSIS — N39.0 URINARY TRACT INFECTION IN FEMALE: ICD-10-CM

## 2021-12-31 DIAGNOSIS — R10.10 PAIN OF UPPER ABDOMEN: Primary | ICD-10-CM

## 2021-12-31 DIAGNOSIS — E87.6 HYPOKALEMIA: ICD-10-CM

## 2021-12-31 DIAGNOSIS — K76.0 HEPATIC STEATOSIS: ICD-10-CM

## 2021-12-31 DIAGNOSIS — R11.2 NON-INTRACTABLE VOMITING WITH NAUSEA, UNSPECIFIED VOMITING TYPE: ICD-10-CM

## 2021-12-31 LAB
ALBUMIN SERPL-MCNC: 4.7 G/DL (ref 3.5–5.2)
ALBUMIN/GLOB SERPL: 1.3 G/DL
ALP SERPL-CCNC: 102 U/L (ref 39–117)
ALT SERPL W P-5'-P-CCNC: 21 U/L (ref 1–33)
AMPHET+METHAMPHET UR QL: NEGATIVE
AMPHETAMINES UR QL: NEGATIVE
ANION GAP SERPL CALCULATED.3IONS-SCNC: 16 MMOL/L (ref 5–15)
AST SERPL-CCNC: 19 U/L (ref 1–32)
BACTERIA UR QL AUTO: ABNORMAL /HPF
BARBITURATES UR QL SCN: NEGATIVE
BASOPHILS # BLD AUTO: 0.03 10*3/MM3 (ref 0–0.2)
BASOPHILS NFR BLD AUTO: 0.4 % (ref 0–1.5)
BENZODIAZ UR QL SCN: NEGATIVE
BILIRUB SERPL-MCNC: 0.5 MG/DL (ref 0–1.2)
BILIRUB UR QL STRIP: NEGATIVE
BUN SERPL-MCNC: 15 MG/DL (ref 6–20)
BUN/CREAT SERPL: 20.5 (ref 7–25)
BUPRENORPHINE SERPL-MCNC: NEGATIVE NG/ML
CALCIUM SPEC-SCNC: 10.4 MG/DL (ref 8.6–10.5)
CANNABINOIDS SERPL QL: NEGATIVE
CHLORIDE SERPL-SCNC: 100 MMOL/L (ref 98–107)
CLARITY UR: CLEAR
CO2 SERPL-SCNC: 23 MMOL/L (ref 22–29)
COCAINE UR QL: NEGATIVE
COLOR UR: YELLOW
CREAT SERPL-MCNC: 0.73 MG/DL (ref 0.57–1)
D-LACTATE SERPL-SCNC: 1.2 MMOL/L (ref 0.5–2)
D-LACTATE SERPL-SCNC: 2.6 MMOL/L (ref 0.5–2)
DEPRECATED RDW RBC AUTO: 44.6 FL (ref 37–54)
EOSINOPHIL # BLD AUTO: 0.12 10*3/MM3 (ref 0–0.4)
EOSINOPHIL NFR BLD AUTO: 1.5 % (ref 0.3–6.2)
ERYTHROCYTE [DISTWIDTH] IN BLOOD BY AUTOMATED COUNT: 14.6 % (ref 12.3–15.4)
ETHANOL BLD-MCNC: <10 MG/DL (ref 0–10)
FLUAV RNA RESP QL NAA+PROBE: NOT DETECTED
FLUBV RNA RESP QL NAA+PROBE: NOT DETECTED
GFR SERPL CREATININE-BSD FRML MDRD: 100 ML/MIN/1.73
GLOBULIN UR ELPH-MCNC: 3.6 GM/DL
GLUCOSE SERPL-MCNC: 194 MG/DL (ref 65–99)
GLUCOSE UR STRIP-MCNC: NEGATIVE MG/DL
HCT VFR BLD AUTO: 46.9 % (ref 34–46.6)
HGB BLD-MCNC: 15 G/DL (ref 12–15.9)
HGB UR QL STRIP.AUTO: NEGATIVE
HOLD SPECIMEN: NORMAL
HYALINE CASTS UR QL AUTO: ABNORMAL /LPF
IMM GRANULOCYTES # BLD AUTO: 0.05 10*3/MM3 (ref 0–0.05)
IMM GRANULOCYTES NFR BLD AUTO: 0.6 % (ref 0–0.5)
KETONES UR QL STRIP: ABNORMAL
LEUKOCYTE ESTERASE UR QL STRIP.AUTO: ABNORMAL
LIPASE SERPL-CCNC: 47 U/L (ref 13–60)
LYMPHOCYTES # BLD AUTO: 2.18 10*3/MM3 (ref 0.7–3.1)
LYMPHOCYTES NFR BLD AUTO: 26.5 % (ref 19.6–45.3)
MAGNESIUM SERPL-MCNC: 2.4 MG/DL (ref 1.6–2.6)
MCH RBC QN AUTO: 27.2 PG (ref 26.6–33)
MCHC RBC AUTO-ENTMCNC: 32 G/DL (ref 31.5–35.7)
MCV RBC AUTO: 85 FL (ref 79–97)
METHADONE UR QL SCN: NEGATIVE
MONOCYTES # BLD AUTO: 0.53 10*3/MM3 (ref 0.1–0.9)
MONOCYTES NFR BLD AUTO: 6.4 % (ref 5–12)
NEUTROPHILS NFR BLD AUTO: 5.33 10*3/MM3 (ref 1.7–7)
NEUTROPHILS NFR BLD AUTO: 64.6 % (ref 42.7–76)
NITRITE UR QL STRIP: NEGATIVE
NRBC BLD AUTO-RTO: 0 /100 WBC (ref 0–0.2)
OPIATES UR QL: NEGATIVE
OXYCODONE UR QL SCN: NEGATIVE
PCP UR QL SCN: NEGATIVE
PH UR STRIP.AUTO: 5.5 [PH] (ref 5–8)
PLATELET # BLD AUTO: 300 10*3/MM3 (ref 140–450)
PMV BLD AUTO: 10.5 FL (ref 6–12)
POTASSIUM SERPL-SCNC: 2.8 MMOL/L (ref 3.5–5.2)
PROPOXYPH UR QL: NEGATIVE
PROT SERPL-MCNC: 8.3 G/DL (ref 6–8.5)
PROT UR QL STRIP: NEGATIVE
RBC # BLD AUTO: 5.52 10*6/MM3 (ref 3.77–5.28)
RBC # UR STRIP: ABNORMAL /HPF
REF LAB TEST METHOD: ABNORMAL
SARS-COV-2 RNA RESP QL NAA+PROBE: NOT DETECTED
SODIUM SERPL-SCNC: 139 MMOL/L (ref 136–145)
SP GR UR STRIP: 1.02 (ref 1–1.03)
SQUAMOUS #/AREA URNS HPF: ABNORMAL /HPF
TRICYCLICS UR QL SCN: NEGATIVE
TROPONIN T SERPL-MCNC: <0.01 NG/ML (ref 0–0.03)
TROPONIN T SERPL-MCNC: <0.01 NG/ML (ref 0–0.03)
UROBILINOGEN UR QL STRIP: ABNORMAL
WBC # UR STRIP: ABNORMAL /HPF
WBC NRBC COR # BLD: 8.24 10*3/MM3 (ref 3.4–10.8)
WHOLE BLOOD HOLD SPECIMEN: NORMAL
WHOLE BLOOD HOLD SPECIMEN: NORMAL

## 2021-12-31 PROCEDURE — 83605 ASSAY OF LACTIC ACID: CPT | Performed by: NURSE PRACTITIONER

## 2021-12-31 PROCEDURE — 25010000002 CEFTRIAXONE PER 250 MG: Performed by: NURSE PRACTITIONER

## 2021-12-31 PROCEDURE — 87636 SARSCOV2 & INF A&B AMP PRB: CPT | Performed by: NURSE PRACTITIONER

## 2021-12-31 PROCEDURE — 85025 COMPLETE CBC W/AUTO DIFF WBC: CPT | Performed by: EMERGENCY MEDICINE

## 2021-12-31 PROCEDURE — 81001 URINALYSIS AUTO W/SCOPE: CPT | Performed by: EMERGENCY MEDICINE

## 2021-12-31 PROCEDURE — 0 POTASSIUM CHLORIDE 10 MEQ/100ML SOLUTION: Performed by: NURSE PRACTITIONER

## 2021-12-31 PROCEDURE — 80053 COMPREHEN METABOLIC PANEL: CPT | Performed by: EMERGENCY MEDICINE

## 2021-12-31 PROCEDURE — 71045 X-RAY EXAM CHEST 1 VIEW: CPT

## 2021-12-31 PROCEDURE — 36415 COLL VENOUS BLD VENIPUNCTURE: CPT

## 2021-12-31 PROCEDURE — 84484 ASSAY OF TROPONIN QUANT: CPT | Performed by: EMERGENCY MEDICINE

## 2021-12-31 PROCEDURE — 96367 TX/PROPH/DG ADDL SEQ IV INF: CPT

## 2021-12-31 PROCEDURE — 99284 EMERGENCY DEPT VISIT MOD MDM: CPT

## 2021-12-31 PROCEDURE — 25010000002 PROMETHAZINE PER 50 MG: Performed by: NURSE PRACTITIONER

## 2021-12-31 PROCEDURE — 87150 DNA/RNA AMPLIFIED PROBE: CPT | Performed by: NURSE PRACTITIONER

## 2021-12-31 PROCEDURE — 83735 ASSAY OF MAGNESIUM: CPT | Performed by: NURSE PRACTITIONER

## 2021-12-31 PROCEDURE — 96365 THER/PROPH/DIAG IV INF INIT: CPT

## 2021-12-31 PROCEDURE — 80306 DRUG TEST PRSMV INSTRMNT: CPT | Performed by: NURSE PRACTITIONER

## 2021-12-31 PROCEDURE — 93005 ELECTROCARDIOGRAM TRACING: CPT | Performed by: EMERGENCY MEDICINE

## 2021-12-31 PROCEDURE — 82077 ASSAY SPEC XCP UR&BREATH IA: CPT | Performed by: NURSE PRACTITIONER

## 2021-12-31 PROCEDURE — 87086 URINE CULTURE/COLONY COUNT: CPT | Performed by: NURSE PRACTITIONER

## 2021-12-31 PROCEDURE — 83690 ASSAY OF LIPASE: CPT | Performed by: EMERGENCY MEDICINE

## 2021-12-31 PROCEDURE — 25010000002 ONDANSETRON PER 1 MG: Performed by: NURSE PRACTITIONER

## 2021-12-31 PROCEDURE — 87147 CULTURE TYPE IMMUNOLOGIC: CPT | Performed by: NURSE PRACTITIONER

## 2021-12-31 PROCEDURE — 93005 ELECTROCARDIOGRAM TRACING: CPT | Performed by: NURSE PRACTITIONER

## 2021-12-31 PROCEDURE — 87040 BLOOD CULTURE FOR BACTERIA: CPT | Performed by: NURSE PRACTITIONER

## 2021-12-31 PROCEDURE — 84484 ASSAY OF TROPONIN QUANT: CPT | Performed by: NURSE PRACTITIONER

## 2021-12-31 PROCEDURE — 74177 CT ABD & PELVIS W/CONTRAST: CPT

## 2021-12-31 PROCEDURE — 96375 TX/PRO/DX INJ NEW DRUG ADDON: CPT

## 2021-12-31 PROCEDURE — 96376 TX/PRO/DX INJ SAME DRUG ADON: CPT

## 2021-12-31 PROCEDURE — 25010000002 IOPAMIDOL 61 % SOLUTION: Performed by: EMERGENCY MEDICINE

## 2021-12-31 RX ORDER — FAMOTIDINE 10 MG/ML
20 INJECTION, SOLUTION INTRAVENOUS ONCE
Status: COMPLETED | OUTPATIENT
Start: 2021-12-31 | End: 2021-12-31

## 2021-12-31 RX ORDER — PROMETHAZINE HYDROCHLORIDE 25 MG/1
25 TABLET ORAL EVERY 6 HOURS PRN
Qty: 16 TABLET | Refills: 0 | Status: SHIPPED | OUTPATIENT
Start: 2021-12-31

## 2021-12-31 RX ORDER — SODIUM CHLORIDE 0.9 % (FLUSH) 0.9 %
10 SYRINGE (ML) INJECTION AS NEEDED
Status: DISCONTINUED | OUTPATIENT
Start: 2021-12-31 | End: 2021-12-31 | Stop reason: HOSPADM

## 2021-12-31 RX ORDER — POTASSIUM CHLORIDE 750 MG/1
40 CAPSULE, EXTENDED RELEASE ORAL ONCE
Status: COMPLETED | OUTPATIENT
Start: 2021-12-31 | End: 2021-12-31

## 2021-12-31 RX ORDER — POTASSIUM CHLORIDE 7.45 MG/ML
10 INJECTION INTRAVENOUS ONCE
Status: COMPLETED | OUTPATIENT
Start: 2021-12-31 | End: 2021-12-31

## 2021-12-31 RX ORDER — ONDANSETRON 2 MG/ML
4 INJECTION INTRAMUSCULAR; INTRAVENOUS ONCE
Status: COMPLETED | OUTPATIENT
Start: 2021-12-31 | End: 2021-12-31

## 2021-12-31 RX ORDER — PROMETHAZINE HYDROCHLORIDE 12.5 MG/1
12.5 TABLET ORAL ONCE
Status: DISCONTINUED | OUTPATIENT
Start: 2021-12-31 | End: 2021-12-31 | Stop reason: HOSPADM

## 2021-12-31 RX ORDER — CEFDINIR 300 MG/1
300 CAPSULE ORAL 2 TIMES DAILY
Qty: 20 CAPSULE | Refills: 0 | Status: SHIPPED | OUTPATIENT
Start: 2021-12-31

## 2021-12-31 RX ORDER — ONDANSETRON 2 MG/ML
4 INJECTION INTRAMUSCULAR; INTRAVENOUS ONCE
Status: DISCONTINUED | OUTPATIENT
Start: 2021-12-31 | End: 2021-12-31 | Stop reason: SDUPTHER

## 2021-12-31 RX ADMIN — SODIUM CHLORIDE 1000 ML: 9 INJECTION, SOLUTION INTRAVENOUS at 13:34

## 2021-12-31 RX ADMIN — ONDANSETRON 4 MG: 2 INJECTION INTRAMUSCULAR; INTRAVENOUS at 13:25

## 2021-12-31 RX ADMIN — FAMOTIDINE 20 MG: 10 INJECTION, SOLUTION INTRAVENOUS at 13:34

## 2021-12-31 RX ADMIN — POTASSIUM CHLORIDE 40 MEQ: 750 CAPSULE, EXTENDED RELEASE ORAL at 15:26

## 2021-12-31 RX ADMIN — POTASSIUM CHLORIDE 10 MEQ: 7.46 INJECTION, SOLUTION INTRAVENOUS at 15:32

## 2021-12-31 RX ADMIN — SODIUM CHLORIDE 1 G: 900 INJECTION INTRAVENOUS at 16:41

## 2021-12-31 RX ADMIN — SODIUM CHLORIDE 1000 ML: 9 INJECTION, SOLUTION INTRAVENOUS at 15:27

## 2021-12-31 RX ADMIN — PROMETHAZINE HYDROCHLORIDE 12.5 MG: 25 INJECTION INTRAMUSCULAR; INTRAVENOUS at 17:32

## 2021-12-31 RX ADMIN — ONDANSETRON 4 MG: 2 INJECTION INTRAMUSCULAR; INTRAVENOUS at 15:30

## 2021-12-31 RX ADMIN — IOPAMIDOL 98 ML: 612 INJECTION, SOLUTION INTRAVENOUS at 14:08

## 2022-01-01 ENCOUNTER — TELEPHONE (OUTPATIENT)
Dept: EMERGENCY DEPT | Facility: HOSPITAL | Age: 57
End: 2022-01-01

## 2022-01-01 LAB
BACTERIA BLD CULT: ABNORMAL
BACTERIA SPEC AEROBE CULT: NORMAL
QT INTERVAL: 368 MS
QT INTERVAL: 404 MS
QTC INTERVAL: 405 MS
QTC INTERVAL: 472 MS

## 2022-01-01 NOTE — TELEPHONE ENCOUNTER
I spoke with the patient.  She is planning on returning to the ER tonight for further evaluation.  Overall she does me she is feeling somewhat better but definitely not worse she denies any fever.  She is aware of her positive blood cultures and my concerns for bacteremia etc.  She will return tonight for further evaluation.

## 2022-01-01 NOTE — ED NOTES
"Called lab to find out status on lactic, states they will \"look for it and run it\"     Eduardo Amaro RN  12/31/21 2046    "

## 2022-01-02 ENCOUNTER — HOSPITAL ENCOUNTER (EMERGENCY)
Facility: HOSPITAL | Age: 57
Discharge: HOME | End: 2022-01-02
Attending: EMERGENCY MEDICINE | Admitting: EMERGENCY MEDICINE

## 2022-01-02 VITALS
RESPIRATION RATE: 16 BRPM | DIASTOLIC BLOOD PRESSURE: 66 MMHG | SYSTOLIC BLOOD PRESSURE: 127 MMHG | BODY MASS INDEX: 32.25 KG/M2 | HEIGHT: 63 IN | HEART RATE: 80 BPM | OXYGEN SATURATION: 96 % | TEMPERATURE: 97.9 F | WEIGHT: 182 LBS

## 2022-01-02 DIAGNOSIS — Z00.00 WELL ADULT EXAM: Primary | ICD-10-CM

## 2022-01-02 DIAGNOSIS — R89.9 ABNORMAL LABORATORY TEST RESULT: ICD-10-CM

## 2022-01-02 LAB
ALBUMIN SERPL-MCNC: 4.2 G/DL (ref 3.5–5.2)
ALBUMIN/GLOB SERPL: 1.4 G/DL
ALP SERPL-CCNC: 93 U/L (ref 39–117)
ALT SERPL W P-5'-P-CCNC: 26 U/L (ref 1–33)
ANION GAP SERPL CALCULATED.3IONS-SCNC: 12 MMOL/L (ref 5–15)
AST SERPL-CCNC: 25 U/L (ref 1–32)
BACTERIA SPEC AEROBE CULT: ABNORMAL
BACTERIA UR QL AUTO: ABNORMAL /HPF
BASOPHILS # BLD AUTO: 0.04 10*3/MM3 (ref 0–0.2)
BASOPHILS NFR BLD AUTO: 0.6 % (ref 0–1.5)
BILIRUB SERPL-MCNC: 0.3 MG/DL (ref 0–1.2)
BILIRUB UR QL STRIP: NEGATIVE
BUN SERPL-MCNC: 20 MG/DL (ref 6–20)
BUN/CREAT SERPL: 18.9 (ref 7–25)
CALCIUM SPEC-SCNC: 9.8 MG/DL (ref 8.6–10.5)
CHLORIDE SERPL-SCNC: 104 MMOL/L (ref 98–107)
CLARITY UR: CLEAR
CO2 SERPL-SCNC: 25 MMOL/L (ref 22–29)
COLOR UR: YELLOW
CREAT SERPL-MCNC: 1.06 MG/DL (ref 0.57–1)
DEPRECATED RDW RBC AUTO: 45.4 FL (ref 37–54)
EOSINOPHIL # BLD AUTO: 0.13 10*3/MM3 (ref 0–0.4)
EOSINOPHIL NFR BLD AUTO: 1.8 % (ref 0.3–6.2)
ERYTHROCYTE [DISTWIDTH] IN BLOOD BY AUTOMATED COUNT: 15 % (ref 12.3–15.4)
GFR SERPL CREATININE-BSD FRML MDRD: 65 ML/MIN/1.73
GLOBULIN UR ELPH-MCNC: 3.1 GM/DL
GLUCOSE SERPL-MCNC: 129 MG/DL (ref 65–99)
GLUCOSE UR STRIP-MCNC: NEGATIVE MG/DL
GRAM STN SPEC: ABNORMAL
HCT VFR BLD AUTO: 44.2 % (ref 34–46.6)
HGB BLD-MCNC: 14.2 G/DL (ref 12–15.9)
HGB UR QL STRIP.AUTO: NEGATIVE
HYALINE CASTS UR QL AUTO: ABNORMAL /LPF
IMM GRANULOCYTES # BLD AUTO: 0.03 10*3/MM3 (ref 0–0.05)
IMM GRANULOCYTES NFR BLD AUTO: 0.4 % (ref 0–0.5)
ISOLATED FROM: ABNORMAL
KETONES UR QL STRIP: NEGATIVE
LEUKOCYTE ESTERASE UR QL STRIP.AUTO: ABNORMAL
LYMPHOCYTES # BLD AUTO: 1.93 10*3/MM3 (ref 0.7–3.1)
LYMPHOCYTES NFR BLD AUTO: 27.2 % (ref 19.6–45.3)
MCH RBC QN AUTO: 27 PG (ref 26.6–33)
MCHC RBC AUTO-ENTMCNC: 32.1 G/DL (ref 31.5–35.7)
MCV RBC AUTO: 84.2 FL (ref 79–97)
MONOCYTES # BLD AUTO: 0.63 10*3/MM3 (ref 0.1–0.9)
MONOCYTES NFR BLD AUTO: 8.9 % (ref 5–12)
NEUTROPHILS NFR BLD AUTO: 4.33 10*3/MM3 (ref 1.7–7)
NEUTROPHILS NFR BLD AUTO: 61.1 % (ref 42.7–76)
NITRITE UR QL STRIP: NEGATIVE
NRBC BLD AUTO-RTO: 0 /100 WBC (ref 0–0.2)
PH UR STRIP.AUTO: 6 [PH] (ref 5–8)
PLATELET # BLD AUTO: 295 10*3/MM3 (ref 140–450)
PMV BLD AUTO: 10.2 FL (ref 6–12)
POTASSIUM SERPL-SCNC: 3.5 MMOL/L (ref 3.5–5.2)
PROCALCITONIN SERPL-MCNC: 0.15 NG/ML (ref 0–0.25)
PROT SERPL-MCNC: 7.3 G/DL (ref 6–8.5)
PROT UR QL STRIP: NEGATIVE
RBC # BLD AUTO: 5.25 10*6/MM3 (ref 3.77–5.28)
RBC # UR STRIP: ABNORMAL /HPF
REF LAB TEST METHOD: ABNORMAL
SODIUM SERPL-SCNC: 141 MMOL/L (ref 136–145)
SP GR UR STRIP: 1.02 (ref 1–1.03)
SQUAMOUS #/AREA URNS HPF: ABNORMAL /HPF
UROBILINOGEN UR QL STRIP: ABNORMAL
WBC # UR STRIP: ABNORMAL /HPF
WBC NRBC COR # BLD: 7.09 10*3/MM3 (ref 3.4–10.8)

## 2022-01-02 PROCEDURE — 85025 COMPLETE CBC W/AUTO DIFF WBC: CPT | Performed by: EMERGENCY MEDICINE

## 2022-01-02 PROCEDURE — 36415 COLL VENOUS BLD VENIPUNCTURE: CPT

## 2022-01-02 PROCEDURE — 99283 EMERGENCY DEPT VISIT LOW MDM: CPT

## 2022-01-02 PROCEDURE — 87040 BLOOD CULTURE FOR BACTERIA: CPT | Performed by: EMERGENCY MEDICINE

## 2022-01-02 PROCEDURE — 81001 URINALYSIS AUTO W/SCOPE: CPT | Performed by: EMERGENCY MEDICINE

## 2022-01-02 PROCEDURE — 84145 PROCALCITONIN (PCT): CPT | Performed by: EMERGENCY MEDICINE

## 2022-01-02 PROCEDURE — 80053 COMPREHEN METABOLIC PANEL: CPT | Performed by: EMERGENCY MEDICINE

## 2022-01-02 NOTE — ED PROVIDER NOTES
"Subjective   56-year-old female presents for evaluation of \"positive blood cultures.\"  Of note, the patient was seen here in the emergency department 2 days ago for upper abdominal pain.  Her work-up at the time was largely unremarkable, and she was discharged home.  Blood cultures were obtained and 1 of 2 came back positive.  As result, the patient was contacted and advised to return to the emergency department for reevaluation.  Currently, the patient feels quite well.  She states that she has felt a bit fatigued over the past week but denies any fevers, chills, myalgias, or other systemic symptoms concerning for underlying sepsis.  She has no other complaints at this time.          Review of Systems   Constitutional: Positive for fatigue.   All other systems reviewed and are negative.      Past Medical History:   Diagnosis Date   • Diabetes mellitus (HCC)     HAD BEEN BORDERLINE PREVIOUSLY    • Essential hypertension    • Serrated polyp of colon 2015       No Known Allergies    Past Surgical History:   Procedure Laterality Date   • ABDOMINAL HYSTERECTOMY      Done for ruptured uterus at the time of    • ABDOMINAL WALL MESH  REMOVAL      at [facility] r/t previuos uretheral sling   • BLADDER SUSPENSION     • CHOLECYSTECTOMY OPEN     • EXPLORATORY LAPAROTOMY  2009    Exploratory laparotomy with enteral lysis and removal of large left-sided pelvic mass (hydrosalpinx).  Additionally TOT and ureteral stents placed       Family History   Problem Relation Age of Onset   • Ovarian cancer Paternal Aunt    • Colon cancer Paternal Uncle    • Diabetes Mother        Social History     Socioeconomic History   • Marital status:    Tobacco Use   • Smoking status: Current Every Day Smoker     Packs/day: 0.50     Types: Cigarettes     Start date:    • Smokeless tobacco: Never Used   Substance and Sexual Activity   • Alcohol use: Yes     Comment: occasional   • Drug use: No   • " Sexual activity: Defer           Objective   Physical Exam  Vitals and nursing note reviewed.   Constitutional:       General: She is not in acute distress.     Appearance: Normal appearance. She is well-developed. She is not diaphoretic.      Comments: Well appearing female in no acute distress   HENT:      Head: Normocephalic and atraumatic.      Comments: Oropharynx is clear and without any erythema or exudates  Eyes:      Pupils: Pupils are equal, round, and reactive to light.   Neck:      Comments: No meningeal signs or nuchal rigidity  Cardiovascular:      Rate and Rhythm: Normal rate and regular rhythm.      Heart sounds: Normal heart sounds. No murmur heard.  No friction rub. No gallop.    Pulmonary:      Effort: Pulmonary effort is normal. No respiratory distress.      Breath sounds: Normal breath sounds. No wheezing or rales.   Abdominal:      General: Bowel sounds are normal. There is no distension.      Palpations: Abdomen is soft. There is no mass.      Tenderness: There is no abdominal tenderness. There is no guarding or rebound.      Comments: No focal abdominal tenderness noted, no peritoneal signs, no pain out of proportion to exam   Musculoskeletal:         General: Normal range of motion.      Cervical back: Neck supple.   Skin:     General: Skin is warm and dry.      Findings: No erythema or rash.   Neurological:      General: No focal deficit present.      Mental Status: She is alert and oriented to person, place, and time.   Psychiatric:         Mood and Affect: Mood normal.         Thought Content: Thought content normal.         Judgment: Judgment normal.         Procedures           ED Course  ED Course as of 01/02/22 1634   Sun Jan 02, 2022   0754 56-year-old female presents to be evaluated for positive blood cultures.  She was seen here in the emergency department 2 days ago for upper abdominal pain which has subsequently resolved.  Blood cultures were obtained at that time and 1 out of 2  returned positive.  As result, the patient was contacted and advised to return to the emergency department for repeat evaluation.  On arrival, the patient is very well-appearing.  Vital signs are reassuring. SIRS negative.  The patient denies any fevers, chills, or other systemic symptoms concerning for underlying sepsis.  I feel that the patient's positive blood culture was most likely a contaminant given her overall clinical presentation.  We will obtain repeat labs and cultures. [DD]   0851 Labs are unrevealing, including normal WBC and procalcitonin. [DD]   0905 Patient reassured and counseled regarding symptomatic management.  The patient will follow up with her primary care physician within the next week.  Repeat blood cultures are currently pending at this time, and the patient will be contacted if the result is positive.  I feel that this is unlikely as the patient's previous positive result was most likely a contaminant given her overall clinical presentation at this time. [DD]   0906 Agreeable with plan and given appropriate strict return precautions. [DD]      ED Course User Index  [DD] Morro Boyd MD                                          Recent Results (from the past 24 hour(s))   Procalcitonin    Collection Time: 01/02/22  8:01 AM    Specimen: Blood   Result Value Ref Range    Procalcitonin 0.15 0.00 - 0.25 ng/mL   CBC Auto Differential    Collection Time: 01/02/22  8:01 AM    Specimen: Blood   Result Value Ref Range    WBC 7.09 3.40 - 10.80 10*3/mm3    RBC 5.25 3.77 - 5.28 10*6/mm3    Hemoglobin 14.2 12.0 - 15.9 g/dL    Hematocrit 44.2 34.0 - 46.6 %    MCV 84.2 79.0 - 97.0 fL    MCH 27.0 26.6 - 33.0 pg    MCHC 32.1 31.5 - 35.7 g/dL    RDW 15.0 12.3 - 15.4 %    RDW-SD 45.4 37.0 - 54.0 fl    MPV 10.2 6.0 - 12.0 fL    Platelets 295 140 - 450 10*3/mm3    Neutrophil % 61.1 42.7 - 76.0 %    Lymphocyte % 27.2 19.6 - 45.3 %    Monocyte % 8.9 5.0 - 12.0 %    Eosinophil % 1.8 0.3 - 6.2 %    Basophil  % 0.6 0.0 - 1.5 %    Immature Grans % 0.4 0.0 - 0.5 %    Neutrophils, Absolute 4.33 1.70 - 7.00 10*3/mm3    Lymphocytes, Absolute 1.93 0.70 - 3.10 10*3/mm3    Monocytes, Absolute 0.63 0.10 - 0.90 10*3/mm3    Eosinophils, Absolute 0.13 0.00 - 0.40 10*3/mm3    Basophils, Absolute 0.04 0.00 - 0.20 10*3/mm3    Immature Grans, Absolute 0.03 0.00 - 0.05 10*3/mm3    nRBC 0.0 0.0 - 0.2 /100 WBC   Comprehensive Metabolic Panel    Collection Time: 01/02/22  8:01 AM    Specimen: Blood   Result Value Ref Range    Glucose 129 (H) 65 - 99 mg/dL    BUN 20 6 - 20 mg/dL    Creatinine 1.06 (H) 0.57 - 1.00 mg/dL    Sodium 141 136 - 145 mmol/L    Potassium 3.5 3.5 - 5.2 mmol/L    Chloride 104 98 - 107 mmol/L    CO2 25.0 22.0 - 29.0 mmol/L    Calcium 9.8 8.6 - 10.5 mg/dL    Total Protein 7.3 6.0 - 8.5 g/dL    Albumin 4.20 3.50 - 5.20 g/dL    ALT (SGPT) 26 1 - 33 U/L    AST (SGOT) 25 1 - 32 U/L    Alkaline Phosphatase 93 39 - 117 U/L    Total Bilirubin 0.3 0.0 - 1.2 mg/dL    eGFR  African Amer 65 >60 mL/min/1.73    Globulin 3.1 gm/dL    A/G Ratio 1.4 g/dL    BUN/Creatinine Ratio 18.9 7.0 - 25.0    Anion Gap 12.0 5.0 - 15.0 mmol/L   Urinalysis With Culture If Indicated - Urine, Clean Catch    Collection Time: 01/02/22  9:20 AM    Specimen: Urine, Clean Catch   Result Value Ref Range    Color, UA Yellow Yellow, Straw    Appearance, UA Clear Clear    pH, UA 6.0 5.0 - 8.0    Specific Gravity, UA 1.018 1.001 - 1.030    Glucose, UA Negative Negative    Ketones, UA Negative Negative    Bilirubin, UA Negative Negative    Blood, UA Negative Negative    Protein, UA Negative Negative    Leuk Esterase, UA Trace (A) Negative    Nitrite, UA Negative Negative    Urobilinogen, UA 0.2 E.U./dL 0.2 - 1.0 E.U./dL   Urinalysis, Microscopic Only - Urine, Clean Catch    Collection Time: 01/02/22  9:20 AM    Specimen: Urine, Clean Catch   Result Value Ref Range    RBC, UA 0-2 None Seen, 0-2 /HPF    WBC, UA 3-5 (A) None Seen, 0-2 /HPF    Bacteria, UA None Seen  "None Seen, Trace /HPF    Squamous Epithelial Cells, UA 3-6 (A) None Seen, 0-2 /HPF    Hyaline Casts, UA 0-6 0 - 6 /LPF    Methodology Automated Microscopy      Note: In addition to lab results from this visit, the labs listed above may include labs taken at another facility or during a different encounter within the last 24 hours. Please correlate lab times with ED admission and discharge times for further clarification of the services performed during this visit.    No orders to display     Vitals:    01/02/22 0739 01/02/22 0807   BP: 132/72 127/66   BP Location: Left arm    Patient Position: Sitting    Pulse: 80    Resp: 16    Temp: 97.9 °F (36.6 °C)    TempSrc: Temporal    SpO2: 99% 96%   Weight: 82.6 kg (182 lb)    Height: 160 cm (63\")      Medications - No data to display  ECG/EMG Results (last 24 hours)     ** No results found for the last 24 hours. **        No orders to display                 MDM    Final diagnoses:   Well adult exam   Abnormal laboratory test result       ED Disposition  ED Disposition     ED Disposition Condition Comment    Discharge Stable           Laverne Santa MD  [address] [address]  [phone]    In 1 week           Medication List      No changes were made to your prescriptions during this visit.          Morro Boyd MD  01/02/22 1634    "

## 2022-01-05 LAB — BACTERIA SPEC AEROBE CULT: NORMAL

## 2022-01-07 LAB
BACTERIA SPEC AEROBE CULT: NORMAL
BACTERIA SPEC AEROBE CULT: NORMAL

## 2022-05-26 NOTE — TELEPHONE ENCOUNTER
Blood sugar was elevated after eating 300+, no thirst or frequency in urination. Has taken medication. Advise to drink more fluid and to walk. Will be seeing provider in the morning. Ate eggs and toast for breakfast.    Reason for Disposition  • [1] Symptoms of high blood sugar (e.g., frequent urination, weak, weight loss) AND [2] not able to test blood glucose  • [1] Blood glucose 240 - 300 mg/dL (13.3 - 16.7 mmol/L) AND [2] does not  use insulin (e.g., not insulin-dependent; most people with type 2 diabetes)    Additional Information  • Negative: Unconscious or difficult to awaken  • Negative: Acting confused (e.g., disoriented, slurred speech)  • Negative: Very weak (e.g., can't stand)  • Negative: Sounds like a life-threatening emergency to the triager  • Negative: [1] Vomiting AND [2] signs of dehydration (e.g., very dry mouth, lightheaded, dark urine)  • Negative: [1] Blood glucose > 240 mg/dL (13.3 mmol/L) AND [2] rapid breathing  • Negative: Blood glucose > 500 mg/dL (27.8 mmol/L)  • Negative: [1] Blood glucose > 240 mg/dL (13.3 mmol/L) AND [2] urine ketones moderate-large (or more than 1+)  • Negative: [1] Blood glucose > 240 mg/dL (13.3 mmol/L) AND [2] blood ketones > 1.4 mmol/L  • Negative: [1] Blood glucose > 240 mg/dL (13.3 mmol/L) AND [2] vomiting AND [3] unable to check for ketones (in blood or urine)  • Negative: [1] New onset Diabetes suspected (e.g., frequent urination, weak, weight loss) AND [2] vomiting or rapid breathing  • Negative: Vomiting lasts > 4 hours  • Negative: Patient sounds very sick or weak to the triager  • Negative: Fever > 100.5 F (38.1 C)  • Negative: Blood glucose > 400 mg/dL (22.2 mmol/L)  • Negative: [1] Blood glucose > 300 mg/dL (16.7 mmol/L) AND [2] two or more times in a row  • Negative: Urine ketones moderate - large (or blood ketones > 1.4 mmol/L)  • Negative: [1] Caller has URGENT medication or insulin pump question AND [2] triager unable to answer question  • Negative:  "New onset diabetes suspected (e.g., frequent urination, weakness, weight loss)  • Negative: [1] Caller has NON-URGENT medication or insulin pump question AND [2] triager unable to answer question  • Negative: [1] Blood glucose 240 - 300 mg/dL (13.3 - 16.7 mmol/L) AND [2] uses insulin (e.g., insulin-dependent, all people with type 1 diabetes)    Answer Assessment - Initial Assessment Questions  1. BLOOD GLUCOSE: \"What is your blood glucose level?\"      230 to 300  2. ONSET: \"When did you check the blood glucose?\"      Prior to eating and after  3. USUAL RANGE: \"What is your glucose level usually?\" (e.g., usual fasting morning value, usual evening value)      Has been elevated  4. KETONES: \"Do you check for ketones (urine or blood test strips)?\" If yes, ask: \"What does the test show now?\"       na  5. TYPE 1 or 2:  \"Do you know what type of diabetes you have?\"  (e.g., Type 1, Type 2, Gestational; doesn't know)       2  6. INSULIN: \"Do you take insulin?\" \"What type of insulin(s) do you use? What is the mode of delivery? (syringe, pen (e.g., injection or  pump)?\"       Yes at night 10 units  7. DIABETES PILLS: \"Do you take any pills for your diabetes?\" If yes, ask: \"Have you missed taking any pills recently?\"      Yes and has taken  8. OTHER SYMPTOMS: \"Do you have any symptoms?\" (e.g., fever, frequent urination, difficulty breathing, dizziness, weakness, vomiting)      no  9. PREGNANCY: \"Is there any chance you are pregnant?\" \"When was your last menstrual period?\"      na    Protocols used: DIABETES - HIGH BLOOD SUGAR-ADULT-AH      " Encounter for palliative care Watery diarrhea